# Patient Record
Sex: FEMALE | Race: WHITE | NOT HISPANIC OR LATINO | Employment: FULL TIME | URBAN - NONMETROPOLITAN AREA
[De-identification: names, ages, dates, MRNs, and addresses within clinical notes are randomized per-mention and may not be internally consistent; named-entity substitution may affect disease eponyms.]

---

## 2017-03-01 RX ORDER — CITALOPRAM 20 MG/1
20 TABLET ORAL DAILY
Qty: 30 TABLET | Refills: 2 | OUTPATIENT
Start: 2017-03-01 | End: 2020-02-23

## 2018-01-05 ENCOUNTER — HOSPITAL ENCOUNTER (OUTPATIENT)
Dept: ULTRASOUND IMAGING | Facility: HOSPITAL | Age: 43
Discharge: HOME | End: 2018-01-05
Admitting: NURSE PRACTITIONER

## 2018-01-05 DIAGNOSIS — R10.11 ABDOMINAL DISCOMFORT IN RIGHT UPPER QUADRANT: ICD-10-CM

## 2018-01-05 DIAGNOSIS — K21.9 GASTROESOPHAGEAL REFLUX DISEASE, ESOPHAGITIS PRESENCE NOT SPECIFIED: ICD-10-CM

## 2018-01-05 DIAGNOSIS — R11.0 NAUSEA: ICD-10-CM

## 2018-01-05 PROCEDURE — 76700 US EXAM ABDOM COMPLETE: CPT

## 2018-12-06 ENCOUNTER — TRANSCRIBE ORDERS (OUTPATIENT)
Dept: LAB | Facility: HOSPITAL | Age: 43
End: 2018-12-06

## 2018-12-06 ENCOUNTER — LAB (OUTPATIENT)
Dept: LAB | Facility: HOSPITAL | Age: 43
End: 2018-12-06
Attending: OBSTETRICS & GYNECOLOGY

## 2018-12-06 DIAGNOSIS — E78.6: ICD-10-CM

## 2018-12-06 DIAGNOSIS — E78.6: Primary | ICD-10-CM

## 2018-12-06 PROCEDURE — 83525 ASSAY OF INSULIN: CPT

## 2018-12-06 PROCEDURE — 36415 COLL VENOUS BLD VENIPUNCTURE: CPT

## 2018-12-09 LAB — INSULIN SERPL-ACNC: 12 UIU/ML

## 2019-05-08 ENCOUNTER — APPOINTMENT (OUTPATIENT)
Dept: GENERAL RADIOLOGY | Facility: HOSPITAL | Age: 44
End: 2019-05-08

## 2019-05-08 ENCOUNTER — HOSPITAL ENCOUNTER (EMERGENCY)
Facility: HOSPITAL | Age: 44
Discharge: HOME | End: 2019-05-08
Attending: EMERGENCY MEDICINE | Admitting: EMERGENCY MEDICINE

## 2019-05-08 VITALS
RESPIRATION RATE: 14 BRPM | WEIGHT: 218.56 LBS | HEART RATE: 89 BPM | HEIGHT: 62 IN | BODY MASS INDEX: 40.22 KG/M2 | OXYGEN SATURATION: 96 % | DIASTOLIC BLOOD PRESSURE: 68 MMHG | TEMPERATURE: 98.4 F | SYSTOLIC BLOOD PRESSURE: 113 MMHG

## 2019-05-08 DIAGNOSIS — K21.00 GERD WITH ESOPHAGITIS: Primary | ICD-10-CM

## 2019-05-08 DIAGNOSIS — R07.89 ATYPICAL CHEST PAIN: ICD-10-CM

## 2019-05-08 LAB
ALBUMIN SERPL-MCNC: 4.1 G/DL (ref 3.5–5.2)
ALBUMIN/GLOB SERPL: 1.3 G/DL
ALP SERPL-CCNC: 96 U/L (ref 39–117)
ALT SERPL W P-5'-P-CCNC: 17 U/L (ref 1–33)
ANION GAP SERPL CALCULATED.3IONS-SCNC: 14 MMOL/L
AST SERPL-CCNC: 14 U/L (ref 1–32)
B-HCG UR QL: NEGATIVE
BACTERIA UR QL AUTO: ABNORMAL /HPF
BASOPHILS # BLD AUTO: 0.04 10*3/MM3 (ref 0–0.2)
BASOPHILS NFR BLD AUTO: 0.3 % (ref 0–1.5)
BILIRUB SERPL-MCNC: 0.3 MG/DL (ref 0.2–1.2)
BILIRUB UR QL STRIP: NEGATIVE
BUN BLD-MCNC: 19 MG/DL (ref 6–20)
BUN/CREAT SERPL: 22.4 (ref 7–25)
CALCIUM SPEC-SCNC: 9.1 MG/DL (ref 8.6–10.5)
CHLORIDE SERPL-SCNC: 102 MMOL/L (ref 98–107)
CLARITY UR: CLEAR
CO2 SERPL-SCNC: 22 MMOL/L (ref 22–29)
COLOR UR: YELLOW
CREAT BLD-MCNC: 0.85 MG/DL (ref 0.57–1)
D-DIMER, QUANTITATIVE (MAD,POW, STR): 285 NG/ML (FEU) (ref 0–470)
DEPRECATED RDW RBC AUTO: 41.9 FL (ref 37–54)
EOSINOPHIL # BLD AUTO: 0.11 10*3/MM3 (ref 0–0.4)
EOSINOPHIL NFR BLD AUTO: 0.7 % (ref 0.3–6.2)
ERYTHROCYTE [DISTWIDTH] IN BLOOD BY AUTOMATED COUNT: 13.3 % (ref 12.3–15.4)
GFR SERPL CREATININE-BSD FRML MDRD: 73 ML/MIN/1.73
GLOBULIN UR ELPH-MCNC: 3.2 GM/DL
GLUCOSE BLD-MCNC: 116 MG/DL (ref 65–99)
GLUCOSE UR STRIP-MCNC: NEGATIVE MG/DL
HCT VFR BLD AUTO: 42.2 % (ref 34–46.6)
HGB BLD-MCNC: 13.8 G/DL (ref 12–15.9)
HGB UR QL STRIP.AUTO: ABNORMAL
HOLD SPECIMEN: NORMAL
HYALINE CASTS UR QL AUTO: ABNORMAL /LPF
IMM GRANULOCYTES # BLD AUTO: 0.07 10*3/MM3 (ref 0–0.05)
IMM GRANULOCYTES NFR BLD AUTO: 0.5 % (ref 0–0.5)
KETONES UR QL STRIP: NEGATIVE
LEUKOCYTE ESTERASE UR QL STRIP.AUTO: NEGATIVE
LIPASE SERPL-CCNC: 19 U/L (ref 13–60)
LYMPHOCYTES # BLD AUTO: 2.34 10*3/MM3 (ref 0.7–3.1)
LYMPHOCYTES NFR BLD AUTO: 15 % (ref 19.6–45.3)
MCH RBC QN AUTO: 28.6 PG (ref 26.6–33)
MCHC RBC AUTO-ENTMCNC: 32.7 G/DL (ref 31.5–35.7)
MCV RBC AUTO: 87.6 FL (ref 79–97)
MONOCYTES # BLD AUTO: 1.2 10*3/MM3 (ref 0.1–0.9)
MONOCYTES NFR BLD AUTO: 7.7 % (ref 5–12)
NEUTROPHILS # BLD AUTO: 11.79 10*3/MM3 (ref 1.7–7)
NEUTROPHILS NFR BLD AUTO: 75.8 % (ref 42.7–76)
NITRITE UR QL STRIP: NEGATIVE
NRBC BLD AUTO-RTO: 0 /100 WBC (ref 0–0.2)
NT-PROBNP SERPL-MCNC: <5 PG/ML (ref 5–450)
PH UR STRIP.AUTO: 6 [PH] (ref 5–9)
PLATELET # BLD AUTO: 245 10*3/MM3 (ref 140–450)
PMV BLD AUTO: 12.3 FL (ref 6–12)
POTASSIUM BLD-SCNC: 3.8 MMOL/L (ref 3.5–5.2)
PROT SERPL-MCNC: 7.3 G/DL (ref 6–8.5)
PROT UR QL STRIP: NEGATIVE
RBC # BLD AUTO: 4.82 10*6/MM3 (ref 3.77–5.28)
RBC # UR: ABNORMAL /HPF
REF LAB TEST METHOD: ABNORMAL
SODIUM BLD-SCNC: 138 MMOL/L (ref 136–145)
SP GR UR STRIP: 1.03 (ref 1–1.03)
SQUAMOUS #/AREA URNS HPF: ABNORMAL /HPF
TROPONIN T SERPL-MCNC: <0.01 NG/ML (ref 0–0.03)
UROBILINOGEN UR QL STRIP: ABNORMAL
WBC NRBC COR # BLD: 15.55 10*3/MM3 (ref 3.4–10.8)
WBC UR QL AUTO: ABNORMAL /HPF

## 2019-05-08 PROCEDURE — 84484 ASSAY OF TROPONIN QUANT: CPT | Performed by: EMERGENCY MEDICINE

## 2019-05-08 PROCEDURE — 83880 ASSAY OF NATRIURETIC PEPTIDE: CPT | Performed by: EMERGENCY MEDICINE

## 2019-05-08 PROCEDURE — 93010 ELECTROCARDIOGRAM REPORT: CPT | Performed by: INTERNAL MEDICINE

## 2019-05-08 PROCEDURE — 93005 ELECTROCARDIOGRAM TRACING: CPT

## 2019-05-08 PROCEDURE — 83690 ASSAY OF LIPASE: CPT | Performed by: EMERGENCY MEDICINE

## 2019-05-08 PROCEDURE — 85379 FIBRIN DEGRADATION QUANT: CPT | Performed by: EMERGENCY MEDICINE

## 2019-05-08 PROCEDURE — 85025 COMPLETE CBC W/AUTO DIFF WBC: CPT | Performed by: EMERGENCY MEDICINE

## 2019-05-08 PROCEDURE — 99284 EMERGENCY DEPT VISIT MOD MDM: CPT

## 2019-05-08 PROCEDURE — 80053 COMPREHEN METABOLIC PANEL: CPT | Performed by: EMERGENCY MEDICINE

## 2019-05-08 PROCEDURE — 93005 ELECTROCARDIOGRAM TRACING: CPT | Performed by: EMERGENCY MEDICINE

## 2019-05-08 PROCEDURE — 81025 URINE PREGNANCY TEST: CPT | Performed by: EMERGENCY MEDICINE

## 2019-05-08 PROCEDURE — 81001 URINALYSIS AUTO W/SCOPE: CPT | Performed by: EMERGENCY MEDICINE

## 2019-05-08 PROCEDURE — 71046 X-RAY EXAM CHEST 2 VIEWS: CPT

## 2019-05-08 RX ORDER — ASPIRIN 325 MG
325 TABLET ORAL ONCE
Status: COMPLETED | OUTPATIENT
Start: 2019-05-08 | End: 2019-05-08

## 2019-05-08 RX ORDER — ALUMINA, MAGNESIA, AND SIMETHICONE 2400; 2400; 240 MG/30ML; MG/30ML; MG/30ML
15 SUSPENSION ORAL ONCE
Status: COMPLETED | OUTPATIENT
Start: 2019-05-08 | End: 2019-05-08

## 2019-05-08 RX ORDER — SUCRALFATE 1 G/1
1 TABLET ORAL 4 TIMES DAILY
Qty: 60 TABLET | Refills: 0 | OUTPATIENT
Start: 2019-05-08 | End: 2021-07-26

## 2019-05-08 RX ADMIN — LIDOCAINE HYDROCHLORIDE 15 ML: 20 SOLUTION ORAL; TOPICAL at 21:36

## 2019-05-08 RX ADMIN — ALUMINUM HYDROXIDE, MAGNESIUM HYDROXIDE, AND DIMETHICONE 15 ML: 400; 400; 40 SUSPENSION ORAL at 21:35

## 2019-05-08 RX ADMIN — ASPIRIN 325 MG: 325 TABLET, COATED ORAL at 21:35

## 2019-05-09 NOTE — ED TRIAGE NOTES
Pt c/o chest pressure since 0500 this am, waxing and waning. Associated nausea. Started at rest. Took Pepto Bismol. Known gallbladder history. No known cardiac history.

## 2019-05-09 NOTE — ED PROVIDER NOTES
Subjective   43 years old female with history of GERD presented in the ER with substernal chest pressure/burning sensation since last night/early morning.  Constant.  No significant aggravating or relieving factor.  Mild to moderate intensity, not associated with any palpitations or shortness of breath.  Has taken Nexium with no significant relief.        History provided by:  Patient  Chest Pain   Pain location:  Substernal area  Pain quality: dull and pressure    Pain radiates to:  Does not radiate  Pain severity:  Moderate  Onset quality:  Gradual  Duration: since morning   Timing:  Constant  Progression:  Worsening  Chronicity:  Recurrent  Relieved by:  Nothing  Worsened by:  Nothing  Ineffective treatments:  Antacids  Associated symptoms: no abdominal pain, no anxiety, no back pain, no claudication, no cough, no diaphoresis, no dizziness, no fever, no lower extremity edema, no near-syncope, no numbness, no palpitations, no shortness of breath, no vomiting and no weakness    Risk factors: hypertension and obesity    Risk factors: no coronary artery disease, no diabetes mellitus and no smoking        Review of Systems   Constitutional: Negative for diaphoresis and fever.   HENT: Negative for congestion, sinus pain and sore throat.    Eyes: Negative for pain.   Respiratory: Positive for chest tightness. Negative for cough and shortness of breath.    Cardiovascular: Positive for chest pain. Negative for palpitations, claudication and near-syncope.   Gastrointestinal: Negative for abdominal pain and vomiting.   Genitourinary: Negative for flank pain.   Musculoskeletal: Negative for back pain.   Skin: Negative for color change.   Neurological: Negative for dizziness, weakness and numbness.   Psychiatric/Behavioral: Negative for agitation.       Past Medical History:   Diagnosis Date   • Acute pharyngitis    • Allergic rhinitis    • Cough    • Depression    • Dysfunction of eustachian tube    • Generalized anxiety  disorder    • H/O screening mammography    • History of mammography, screening     [facility]GRAM SCREENING [facility] (jan 2016)   • Motion sickness    • Otalgia    • Panic disorder    • Upper abdominal pain, unspecified    • Upper respiratory infection        Allergies   Allergen Reactions   • Codeine        Past Surgical History:   Procedure Laterality Date   • PAP SMEAR  10/28/2008    PAP SMEAR (normal)   • WISDOM TOOTH EXTRACTION      Dental procedure (wisdom teeth extraction)       History reviewed. No pertinent family history.    Social History     Socioeconomic History   • Marital status:      Spouse name: Not on file   • Number of children: Not on file   • Years of education: Not on file   • Highest education level: Not on file   Tobacco Use   • Smoking status: Never Smoker   • Smokeless tobacco: Never Used   Substance and Sexual Activity   • Alcohol use: Yes     Comment: social   • Drug use: No   • Sexual activity: Defer           Objective   Physical Exam   Constitutional: She is oriented to person, place, and time. She appears well-developed and well-nourished.   HENT:   Head: Normocephalic.   Eyes: EOM are normal. Pupils are equal, round, and reactive to light.   Neck: Normal range of motion.   Cardiovascular: Normal rate and regular rhythm.   Pulmonary/Chest: Effort normal and breath sounds normal. She has no decreased breath sounds. She has no wheezes.   Musculoskeletal: Normal range of motion.   Neurological: She is alert and oriented to person, place, and time.   Skin: Skin is warm and dry. Capillary refill takes less than 2 seconds.   Psychiatric: She has a normal mood and affect. Her behavior is normal.   Nursing note and vitals reviewed.      ECG 12 Lead    Performed by: Jorge Zavala MD  Authorized by: Jorge Zavala MD   Interpreted by physician  Rhythm: sinus tachycardia  Rate: tachycardic  BPM: 108  QRS axis: normal  Conduction: conduction normal  ST Segments: ST segments normal  T  Waves: T waves normal  Clinical impression: non-specific ECG                 ED Course                  MDM  Number of Diagnoses or Management Options  Atypical chest pain:   GERD with esophagitis:   Diagnosis management comments: 43-year-old is evaluated in the ER for substernal chest pressure.  EKG did not show any acute ischemic changes.  Has negative plan to be done.  No acute finding on chest x-ray.  Patient is given GI cocktail and on reevaluation her pain is completely relieved.  She is off her second troponin but patient does not want to stay.  Technically one troponin rules out and she is been having pain for almost 16 hours.  Heart score is low.  She is advised to follow-up outpatient with cardiology/primary care.  I would also start her on Carafate as her symptoms more seems GERD with esophagitis.  Discussed signs of worsening needing return to ER which is understanding.       Amount and/or Complexity of Data Reviewed  Clinical lab tests: ordered and reviewed  Tests in the radiology section of CPT®: ordered and reviewed      Labs Reviewed   COMPREHENSIVE METABOLIC PANEL - Abnormal; Notable for the following components:       Result Value    Glucose 116 (*)     All other components within normal limits    Narrative:     GFR Normal >60  Chronic Kidney Disease <60  Kidney Failure <15   URINALYSIS W/ MICROSCOPIC IF INDICATED (NO CULTURE) - Abnormal; Notable for the following components:    Blood, UA Small (1+) (*)     All other components within normal limits   BNP (IN-HOUSE) - Abnormal; Notable for the following components:    proBNP <5.0 (*)     All other components within normal limits    Narrative:     Among patients with dyspnea, NT-proBNP is highly sensitive for the detection of acute congestive heart failure. In addition NT-proBNP of <300 pg/ml effectively rules out acute congestive heart failure with 99% negative predictive value.   CBC WITH AUTO DIFFERENTIAL - Abnormal; Notable for the following  components:    WBC 15.55 (*)     MPV 12.3 (*)     Lymphocyte % 15.0 (*)     Neutrophils, Absolute 11.79 (*)     Monocytes, Absolute 1.20 (*)     Immature Grans, Absolute 0.07 (*)     All other components within normal limits   URINALYSIS, MICROSCOPIC ONLY - Abnormal; Notable for the following components:    RBC, UA 6-12 (*)     Squamous Epithelial Cells, UA 3-5 (*)     All other components within normal limits   LIPASE - Normal   D-DIMER, QUANTITATIVE - Normal    Narrative:     Dimer values <500 ng/ml FEU are FDA approved as aid in diagnosis of deep venous thrombosis and pulmonary embolism.  This test should not be used in an exclusion strategy with pretest probability alone.    A recent guideline regarding diagnosis for pulmonary thromboembolism recommends an adjusted exclusion criterion of age x 10 ng/ml FEU for patients >50 years of age ([employer] Intern Med 2015; 163: 701-711).   TROPONIN (IN-HOUSE) - Normal    Narrative:     Troponin T Reference Range:  <= 0.03 ng/mL-   Negative for AMI  >0.03 ng/mL-     Abnormal for myocardial necrosis.  Clinicians would have to utilize clinical acumen, EKG, Troponin and serial changes to determine if it is an Acute Myocardial Infarction or myocardial injury due to an underlying chronic condition.    PREGNANCY, URINE - Normal   CBC AND DIFFERENTIAL    Narrative:     The following orders were created for panel order CBC & Differential.  Procedure                               Abnormality         Status                     ---------                               -----------         ------                     CBC Auto Differential[[id number]]        Abnormal            Final result                 Please view results for these tests on the individual orders.   EXTRA TUBES    Narrative:     The following orders were created for panel order Extra Tubes.  Procedure                               Abnormality         Status                     ---------                                -----------         ------                     [facility] - [facility][[id number]]                                   Final result                 Please view results for these tests on the individual orders.   GOLD TOP - SST       Xr Chest 2 View    Result Date: 5/8/2019  Narrative: TWO VIEW CHEST HISTORY: Chest pain Frontal and lateral films of the chest were obtained. COMPARISON: None EKG leads. The lungs are clear of an acute process. The heart is not enlarged. The pulmonary vasculature is not increased. No pleural effusion. No pneumothorax. No acute osseous abnormality. Mild degenerative changes are present in the thoracic spine.     Impression: CONCLUSION: No Acute Disease [id number] Electronically signed by:  Brijesh Cifuentes MD  5/8/2019 10:35 PM CDT Workstation: [id number]          Final diagnoses:   GERD with esophagitis   Atypical chest pain            Jorge Zavala MD  05/08/19 1946

## 2020-07-08 ENCOUNTER — APPOINTMENT (OUTPATIENT)
Dept: GENERAL RADIOLOGY | Facility: HOSPITAL | Age: 45
End: 2020-07-08

## 2020-07-08 ENCOUNTER — HOSPITAL ENCOUNTER (EMERGENCY)
Facility: HOSPITAL | Age: 45
Discharge: HOME | End: 2020-07-08
Attending: FAMILY MEDICINE | Admitting: FAMILY MEDICINE

## 2020-07-08 VITALS
DIASTOLIC BLOOD PRESSURE: 85 MMHG | HEIGHT: 62 IN | OXYGEN SATURATION: 94 % | TEMPERATURE: 98.4 F | SYSTOLIC BLOOD PRESSURE: 144 MMHG | WEIGHT: 217 LBS | RESPIRATION RATE: 20 BRPM | HEART RATE: 96 BPM | BODY MASS INDEX: 39.93 KG/M2

## 2020-07-08 DIAGNOSIS — I10 ESSENTIAL HYPERTENSION: ICD-10-CM

## 2020-07-08 DIAGNOSIS — F41.9 ANXIETY: Primary | ICD-10-CM

## 2020-07-08 LAB
ALBUMIN SERPL-MCNC: 4.5 G/DL (ref 3.5–5.2)
ALBUMIN/GLOB SERPL: 1.6 G/DL
ALP SERPL-CCNC: 109 U/L (ref 39–117)
ALT SERPL W P-5'-P-CCNC: 19 U/L (ref 1–33)
ANION GAP SERPL CALCULATED.3IONS-SCNC: 11 MMOL/L (ref 5–15)
AST SERPL-CCNC: 20 U/L (ref 1–32)
BASOPHILS # BLD AUTO: 0.05 10*3/MM3 (ref 0–0.2)
BASOPHILS NFR BLD AUTO: 0.3 % (ref 0–1.5)
BILIRUB SERPL-MCNC: 0.3 MG/DL (ref 0–1.2)
BUN SERPL-MCNC: 16 MG/DL (ref 6–20)
BUN/CREAT SERPL: 16 (ref 7–25)
CALCIUM SPEC-SCNC: 9.2 MG/DL (ref 8.6–10.5)
CHLORIDE SERPL-SCNC: 102 MMOL/L (ref 98–107)
CO2 SERPL-SCNC: 25 MMOL/L (ref 22–29)
CREAT SERPL-MCNC: 1 MG/DL (ref 0.57–1)
DEPRECATED RDW RBC AUTO: 42.9 FL (ref 37–54)
EOSINOPHIL # BLD AUTO: 0.07 10*3/MM3 (ref 0–0.4)
EOSINOPHIL NFR BLD AUTO: 0.5 % (ref 0.3–6.2)
ERYTHROCYTE [DISTWIDTH] IN BLOOD BY AUTOMATED COUNT: 13.6 % (ref 12.3–15.4)
GFR SERPL CREATININE-BSD FRML MDRD: 60 ML/MIN/1.73
GLOBULIN UR ELPH-MCNC: 2.9 GM/DL
GLUCOSE SERPL-MCNC: 121 MG/DL (ref 65–99)
HCT VFR BLD AUTO: 43.4 % (ref 34–46.6)
HGB BLD-MCNC: 14.6 G/DL (ref 12–15.9)
HOLD SPECIMEN: NORMAL
HOLD SPECIMEN: NORMAL
IMM GRANULOCYTES # BLD AUTO: 0.09 10*3/MM3 (ref 0–0.05)
IMM GRANULOCYTES NFR BLD AUTO: 0.6 % (ref 0–0.5)
LYMPHOCYTES # BLD AUTO: 2.29 10*3/MM3 (ref 0.7–3.1)
LYMPHOCYTES NFR BLD AUTO: 15 % (ref 19.6–45.3)
MCH RBC QN AUTO: 29.3 PG (ref 26.6–33)
MCHC RBC AUTO-ENTMCNC: 33.6 G/DL (ref 31.5–35.7)
MCV RBC AUTO: 87 FL (ref 79–97)
MONOCYTES # BLD AUTO: 1.05 10*3/MM3 (ref 0.1–0.9)
MONOCYTES NFR BLD AUTO: 6.9 % (ref 5–12)
NEUTROPHILS NFR BLD AUTO: 11.74 10*3/MM3 (ref 1.7–7)
NEUTROPHILS NFR BLD AUTO: 76.7 % (ref 42.7–76)
NRBC BLD AUTO-RTO: 0 /100 WBC (ref 0–0.2)
PLATELET # BLD AUTO: 305 10*3/MM3 (ref 140–450)
PMV BLD AUTO: 11.8 FL (ref 6–12)
POTASSIUM SERPL-SCNC: 4 MMOL/L (ref 3.5–5.2)
PROT SERPL-MCNC: 7.4 G/DL (ref 6–8.5)
RBC # BLD AUTO: 4.99 10*6/MM3 (ref 3.77–5.28)
SODIUM SERPL-SCNC: 138 MMOL/L (ref 136–145)
TROPONIN T SERPL-MCNC: <0.01 NG/ML (ref 0–0.03)
WBC # BLD AUTO: 15.29 10*3/MM3 (ref 3.4–10.8)
WHOLE BLOOD HOLD SPECIMEN: NORMAL
WHOLE BLOOD HOLD SPECIMEN: NORMAL

## 2020-07-08 PROCEDURE — 93010 ELECTROCARDIOGRAM REPORT: CPT | Performed by: INTERNAL MEDICINE

## 2020-07-08 PROCEDURE — 96374 THER/PROPH/DIAG INJ IV PUSH: CPT

## 2020-07-08 PROCEDURE — 93005 ELECTROCARDIOGRAM TRACING: CPT | Performed by: STUDENT IN AN ORGANIZED HEALTH CARE EDUCATION/TRAINING PROGRAM

## 2020-07-08 PROCEDURE — 84484 ASSAY OF TROPONIN QUANT: CPT | Performed by: STUDENT IN AN ORGANIZED HEALTH CARE EDUCATION/TRAINING PROGRAM

## 2020-07-08 PROCEDURE — 85025 COMPLETE CBC W/AUTO DIFF WBC: CPT | Performed by: STUDENT IN AN ORGANIZED HEALTH CARE EDUCATION/TRAINING PROGRAM

## 2020-07-08 PROCEDURE — 99283 EMERGENCY DEPT VISIT LOW MDM: CPT

## 2020-07-08 PROCEDURE — 25010000002 LORAZEPAM PER 2 MG: Performed by: STUDENT IN AN ORGANIZED HEALTH CARE EDUCATION/TRAINING PROGRAM

## 2020-07-08 PROCEDURE — 71045 X-RAY EXAM CHEST 1 VIEW: CPT

## 2020-07-08 PROCEDURE — 80053 COMPREHEN METABOLIC PANEL: CPT | Performed by: STUDENT IN AN ORGANIZED HEALTH CARE EDUCATION/TRAINING PROGRAM

## 2020-07-08 RX ORDER — HYDROXYZINE PAMOATE 25 MG/1
50 CAPSULE ORAL ONCE
Status: DISCONTINUED | OUTPATIENT
Start: 2020-07-08 | End: 2020-07-08

## 2020-07-08 RX ORDER — SODIUM CHLORIDE 0.9 % (FLUSH) 0.9 %
10 SYRINGE (ML) INJECTION AS NEEDED
Status: DISCONTINUED | OUTPATIENT
Start: 2020-07-08 | End: 2020-07-08 | Stop reason: HOSPADM

## 2020-07-08 RX ORDER — LORAZEPAM 2 MG/ML
0.5 INJECTION INTRAMUSCULAR EVERY 4 HOURS PRN
Status: DISCONTINUED | OUTPATIENT
Start: 2020-07-08 | End: 2020-07-08

## 2020-07-08 RX ORDER — METOPROLOL TARTRATE 5 MG/5ML
5 INJECTION INTRAVENOUS ONCE
Status: DISCONTINUED | OUTPATIENT
Start: 2020-07-08 | End: 2020-07-08

## 2020-07-08 RX ORDER — LORAZEPAM 2 MG/ML
INJECTION INTRAMUSCULAR
Status: DISCONTINUED
Start: 2020-07-08 | End: 2020-07-08 | Stop reason: HOSPADM

## 2020-07-08 RX ORDER — LORAZEPAM 2 MG/ML
0.5 INJECTION INTRAMUSCULAR ONCE
Status: COMPLETED | OUTPATIENT
Start: 2020-07-08 | End: 2020-07-08

## 2020-07-08 RX ADMIN — LORAZEPAM 0.5 MG: 2 INJECTION INTRAMUSCULAR; INTRAVENOUS at 14:55

## 2020-07-08 NOTE — ED TRIAGE NOTES
"Pt was at work and began to feel \"bad.\" She checked her bp and hr and noted them to both be high.  "

## 2020-07-09 ENCOUNTER — EPISODE CHANGES (OUTPATIENT)
Dept: CASE MANAGEMENT | Facility: OTHER | Age: 45
End: 2020-07-09

## 2020-07-09 NOTE — ED PROVIDER NOTES
Subjective   History of Present Illness  Patient works in the [employer], and was feeling funny so checked her blood pressure.  Her blood pressure was 190/102.  Patient rechecked it a few minutes later and it dropped down to 150.  Patient came to the emergency department.  She denies chest pain shortness of breath nausea vomiting.  She does not take any medicine for blood pressure.  She did state that she had an episode like this previously and it was a anxiety attack.  Patient takes Zoloft and Atarax for anxiety.  Review of Systems   Constitutional: Positive for activity change. Negative for appetite change, chills, fatigue and fever.   HENT: Negative for drooling, ear discharge, ear pain, facial swelling, hearing loss, mouth sores, rhinorrhea and sinus pain.    Eyes: Negative for pain, redness and itching.   Respiratory: Negative for cough, choking, chest tightness, shortness of breath and stridor.    Cardiovascular: Negative for chest pain, palpitations and leg swelling.   Gastrointestinal: Negative for abdominal distention, abdominal pain, anal bleeding, blood in stool, constipation, diarrhea and nausea.   Endocrine: Negative for heat intolerance, polydipsia and polyphagia.   Genitourinary: Negative for dysuria, flank pain, frequency and genital sores.   Musculoskeletal: Negative for back pain, gait problem, joint swelling and myalgias.   Skin: Negative for pallor and rash.   Neurological: Negative for seizures, facial asymmetry, speech difficulty, light-headedness, numbness and headaches.   Hematological: Negative for adenopathy. Does not bruise/bleed easily.   Psychiatric/Behavioral: Negative for confusion, decreased concentration, dysphoric mood and hallucinations. The patient is not nervous/anxious and is not hyperactive.        Past Medical History:   Diagnosis Date   • Acute pharyngitis    • Allergic rhinitis    • Cough    • Depression    • Dysfunction of eustachian tube    • Generalized anxiety disorder     • H/O screening mammography    • History of mammography, screening     M[facility]GRAM SCREENING 95408 [facility] (jan 2016)   • Motion sickness    • Otalgia    • Panic disorder    • Upper abdominal pain, unspecified    • Upper respiratory infection        Allergies   Allergen Reactions   • Codeine Hallucinations   • Lisinopril Cough       Past Surgical History:   Procedure Laterality Date   • PAP SMEAR  10/28/2008    PAP SMEAR (normal)   • WISDOM TOOTH EXTRACTION      Dental procedure (wisdom teeth extraction)       History reviewed. No pertinent family history.    Social History     Socioeconomic History   • Marital status:      Spouse name: Not on file   • Number of children: Not on file   • Years of education: Not on file   • Highest education level: Not on file   Tobacco Use   • Smoking status: Never Smoker   • Smokeless tobacco: Never Used   Substance and Sexual Activity   • Alcohol use: Yes     Comment: social   • Drug use: No   • Sexual activity: Defer           Objective   Physical Exam   Constitutional: She is oriented to person, place, and time. She appears well-developed and well-nourished.   HENT:   Head: Normocephalic and atraumatic.   Right Ear: External ear normal.   Left Ear: External ear normal.   Nose: Nose normal.   Mouth/Throat: Oropharynx is clear and moist.   Eyes: Pupils are equal, round, and reactive to light. Conjunctivae and EOM are normal.   Neck: Normal range of motion. Neck supple.   Cardiovascular: Normal rate, regular rhythm, normal heart sounds and intact distal pulses. Exam reveals no gallop.   No murmur heard.  Pulmonary/Chest: Effort normal and breath sounds normal. No stridor. No respiratory distress. She has no wheezes. She has no rales. She exhibits no tenderness.   Abdominal: Soft. Bowel sounds are normal.   Musculoskeletal: Normal range of motion.   Neurological: She is alert and oriented to person, place, and time.   Skin: Skin is warm and dry.   Psychiatric: She has a  normal mood and affect. Her behavior is normal. Judgment and thought content normal.       Procedures           ED Course  ED Course as of Jul 08 2028   Wed Jul 08, 2020 2028 She was given Ativan.  Her blood pressure came down on its own.  Patient is to follow-up with her primary care and possibly get started on a antihypertensive medication.  Patient is to return to the ED if she has worsening symptoms    [SA]      ED Course User Index  [SA] Amberly Jones MD               I personally saw and examined the patient. I have reviewed and agree with the residents findings including all diagnostics, interpretations, and treatment plans as documented. I was present for the key portions of the separate performed procedures and inclusive time noted in any critical care situation.                                  MDM    Final diagnoses:   Anxiety   Essential hypertension            Amberly Jones M.D. PGY2  [facility]  [address] [place] [address]  Office: [phone]    This document has been electronically signed by Amberly Jones MD on July 8, 2020 20:28         Amberly Jones MD  Resident  07/08/20 2028       Raghav Og MD  07/09/20 [id number]

## 2020-07-17 ENCOUNTER — EPISODE CHANGES (OUTPATIENT)
Dept: CASE MANAGEMENT | Facility: OTHER | Age: 45
End: 2020-07-17

## 2020-08-04 ENCOUNTER — OFFICE VISIT (OUTPATIENT)
Dept: PODIATRY | Facility: CLINIC | Age: 45
End: 2020-08-04

## 2020-08-04 VITALS — HEIGHT: 62 IN | HEART RATE: 97 BPM | WEIGHT: 217 LBS | BODY MASS INDEX: 39.93 KG/M2 | OXYGEN SATURATION: 98 %

## 2020-08-04 DIAGNOSIS — M76.61 ACHILLES TENDINITIS OF RIGHT LOWER EXTREMITY: Primary | ICD-10-CM

## 2020-08-04 DIAGNOSIS — M24.573 EQUINUS CONTRACTURE OF ANKLE: ICD-10-CM

## 2020-08-04 DIAGNOSIS — M79.671 RIGHT FOOT PAIN: ICD-10-CM

## 2020-08-04 DIAGNOSIS — M77.31 POSTERIOR CALCANEAL EXOSTOSIS, RIGHT: ICD-10-CM

## 2020-08-04 PROCEDURE — 99203 OFFICE O/P NEW LOW 30 MIN: CPT | Performed by: PODIATRIST

## 2020-08-04 RX ORDER — MELOXICAM 15 MG/1
15 TABLET ORAL DAILY
Qty: 21 TABLET | Refills: 0 | Status: SHIPPED | OUTPATIENT
Start: 2020-08-04 | End: 2022-05-26

## 2020-08-04 RX ORDER — METHYLPREDNISOLONE 4 MG/1
TABLET ORAL
Qty: 21 TABLET | Refills: 0 | Status: SHIPPED | OUTPATIENT
Start: 2020-08-04 | End: 2021-07-26

## 2020-08-04 NOTE — PROGRESS NOTES
Asha Alvarez  1975  44 y.o. female   NON DIABETIC     Patient came to clinic for concern of right foot pain states her pain is 3/10    2020  Chief Complaint   Patient presents with   • Right Foot - Pain           History of Present Illness    Asha Alvarez is a 44 y.o. female who presents for evaluation of right foot pain.  States his pain is been ongoing for about 6 months.  She denies any trauma or injuries to it.  States pain is primarily located in the backside of her heel.  She describes this as achy and at times stabbing.  The pain is worse with prolonged weightbearing and activity and somewhat relieved with rest.  She states shortly prior to the onset of this pain she was having some pain in the bottom of her foot which she attributed to plantar fasciitis.  She self treated this with some NSAIDs and shoe gear modification and this pain had seem to improve.  She denies any prior treatments for this new problem.      Past Medical History:   Diagnosis Date   • Acute pharyngitis    • Allergic rhinitis    • Asthma    • Cough    • Depression    • Dysfunction of eustachian tube    • Generalized anxiety disorder    • H/O screening mammography    • High blood pressure    • History of mammography, screening     MAM[facility]GRAM SCREENING [facility]73 [facility] (2016)   • Ingrown toenail    • Motion sickness    • Otalgia    • Panic disorder    • Plantar fasciitis    • Upper abdominal pain, unspecified    • Upper respiratory infection          Past Surgical History:   Procedure Laterality Date   •  SECTION     • PAP SMEAR  10/28/2008    PAP SMEAR (normal)   • TUBAL ABDOMINAL LIGATION     • WISDOM TOOTH EXTRACTION      Dental procedure (wisdom teeth extraction)         Family History   Problem Relation Age of Onset   • Osteoporosis Mother    • Diabetes Mother    • Hypertension Mother    • Diabetes Father    • Hypertension Father    • Hypertension Maternal Grandfather    • Hypertension Paternal  Grandmother    • Thyroid disease Paternal Grandmother    • Heart disease Paternal Grandfather    • Hypertension Paternal Grandfather          Social History     Socioeconomic History   • Marital status:      Spouse name: Not on file   • Number of children: Not on file   • Years of education: Not on file   • Highest education level: Not on file   Tobacco Use   • Smoking status: Never Smoker   • Smokeless tobacco: Never Used   Substance and Sexual Activity   • Alcohol use: Yes     Comment: social   • Drug use: No   • Sexual activity: Defer         Current Outpatient Medications   Medication Sig Dispense Refill   • albuterol sulfate  (90 Base) MCG/ACT inhaler Inhale 2 puffs Every 6 (Six) Hours As Needed for bronchospasm. 18 g 5   • albuterol sulfate  (90 Base) MCG/ACT inhaler Inhale 1 to 2 puffs Every 4 (Four) to 6 (Six) Hours As Needed. 18 g 2   • buPROPion SR (WELLBUTRIN SR) 200 MG 12 hr tablet Take 1 tablet by mouth every morning. 90 tablet 0   • buPROPion SR (WELLBUTRIN SR) 200 MG 12 hr tablet Take 1 tablet by mouth Every Morning. 90 tablet 0   • cetirizine (zyrTEC) 10 MG tablet Take 1 tablet by mouth Daily. 30 tablet 2   • esomeprazole (nexIUM) 40 MG capsule Take 1 capsule by mouth Daily. 90 capsule 1   • ezetimibe (ZETIA) 10 MG tablet Take 1 tablet by mouth Daily. 30 tablet 2   • fluticasone (FLONASE) 50 MCG/ACT nasal spray Instill 1 spray into each nostril once Daily. 16 g 2   • fluticasone (FLONASE) 50 MCG/ACT nasal spray Instil 1 to 2 puffs in the nostrils as directed once daily. 16 g 2   • hydroCHLOROthiazide (HYDRODIURIL) 12.5 MG tablet Take 1 tablet by mouth Daily. 30 tablet 1   • hydrOXYzine pamoate (VISTARIL) 25 MG capsule Take 1 capsule by mouth Every 8 (Eight) Hours As Needed for breakthrough anxiety. 90 capsule 5   • hydrOXYzine pamoate (VISTARIL) 25 MG capsule Take 1 capsule by mouth Every 8 (Eight) Hours As Needed. 90 capsule 0   • LORazepam (ATIVAN) 0.5 MG tablet Take 1 tablet  "by mouth Daily As Needed for anxiety. 30 tablet 0   • montelukast (SINGULAIR) 10 MG tablet Take 1 tablet by mouth Daily. 30 tablet 4   • montelukast (SINGULAIR) 10 MG tablet Take 1 tablet by mouth Daily. 90 tablet 1   • pravastatin (PRAVACHOL) 10 MG tablet Take 1 tablet by mouth at bedtime. 90 tablet 1   • pseudoephedrine (SUDAFED) 30 MG tablet Take 2 tablets by mouth Every 6 (Six) Hours As Needed for Congestion. 30 tablet 0   • sertraline (ZOLOFT) 100 MG tablet Take 1 tablet by mouth Daily. 90 tablet 0   • sertraline (ZOLOFT) 50 MG tablet Take 1 tablet by mouth daily. 90 tablet 0   • sucralfate (CARAFATE) 1 g tablet Take 1 tablet by mouth 4 (Four) Times a Day. 60 tablet 0   • sucralfate (CARAFATE) 1 g tablet Take 1 tablet by mouth 2 (Two) Times a Day on an empty stomach. 60 tablet 0   • venlafaxine XR (EFFEXOR-XR) 37.5 MG 24 hr capsule Take 1 capsule by mouth Daily. 30 capsule 1   • Vitamin D, Cholecalciferol, (CHOLECALCIFEROL) 400 units tablet Take 1 tablet by mouth Daily. 30 tablet 3   • meloxicam (MOBIC) 15 MG tablet Take 1 tablet by mouth Daily **Take with food** 21 tablet 0   • methylPREDNISolone (MEDROL, KIESHA,) 4 MG tablet Take as directed on package with food. 21 tablet 0     No current facility-administered medications for this visit.          OBJECTIVE    Pulse 97   Ht 157.5 cm (62\")   Wt 98.4 kg (217 lb)   SpO2 98%   BMI 39.69 kg/m²       Review of Systems   Constitutional: Negative.    HENT: Negative.    Eyes: Negative.    Respiratory: Negative.    Cardiovascular: Negative.    Gastrointestinal: Negative.    Endocrine: Negative.    Genitourinary: Negative.    Musculoskeletal: Positive for back pain.        Foot pain    Skin: Negative.    Allergic/Immunologic: Negative.    Neurological: Negative.    Hematological: Negative.    Psychiatric/Behavioral: The patient is nervous/anxious.          Physical Exam   Constitutional: he appears well-developed and well-nourished.   HEENT: Normocephalic. " Atraumatic.  CV: No CP. RRR  Resp: Non-labored respirations.  Psychiatric: he has a normal mood and affect. his behavior is normal.         Lower Extremity Exam:  Vascular: DP/PT pulses palpable 2+.   No edema  Foot warm  Negative Gerry  Neuro: Protective sensation intact, b/l.  Light touch sensation intact, b/l  DTRs intact  Integument: No open wounds or lesions.  No erythema, scaling  No ecchymosis  No masses  Musculoskeletal:  B/L LE muscle strength 5/5.   Achilles, TA, TP, Peroneal tendons intact  Mild tenderness on palpation of posterior Achilles insertion, right  Minimal tenderness with deep palpation of retrocalcaneal bursa, right  No pain with maximum dorsiflexion of right foot          ASSESSMENT AND PLAN    Asha was seen today for pain.    Diagnoses and all orders for this visit:    Achilles tendinitis of right lower extremity    Right foot pain  -     XR Foot 3+ View Right    Posterior calcaneal exostosis, right    Equinus contracture of ankle    Other orders  -     methylPREDNISolone (MEDROL, KIESHA,) 4 MG tablet; Take as directed on package with food.  -     meloxicam (MOBIC) 15 MG tablet; Take 1 tablet by mouth Daily **Take with food**      -Comprehensive foot and ankle exam performed  -Radiographs ordered and reviewed  -Educated pt on diagnosis, etiology and treatment of insertional achilles tendonitis  -Continue motion control shoe  -Dispensed gel heel lift  -Begin stretching regimen, education materials and Theraband dispensed.  -Rx Medrol dosepak followed by Meloxicam 15 mg qday for 1 month, not to be taken with other NSAIDs  -Recheck 4 weeks, as needed            This document has been electronically signed by Mohan Mera DPM on August 5, 2020 12:00     EMR Dragon/Transcription disclaimer:   Much of this encounter note is an electronic transcription/translation of spoken language to printed text. The electronic translation of spoken language may permit erroneous, or at times, nonsensical  words or phrases to be inadvertently transcribed; Although I have reviewed the note for such errors, some may still exist.    Mohan Mera DPM  8/5/2020  12:00

## 2020-08-19 RX ORDER — MELOXICAM 15 MG/1
15 TABLET ORAL DAILY
Qty: 21 TABLET | Refills: 0 | Status: CANCELLED | OUTPATIENT
Start: 2020-08-19

## 2021-01-29 ENCOUNTER — IMMUNIZATION (OUTPATIENT)
Dept: VACCINE CLINIC | Facility: HOSPITAL | Age: 46
End: 2021-01-29

## 2021-01-29 PROCEDURE — 91300 HC SARSCOV02 VAC 30MCG/0.3ML IM: CPT | Performed by: THORACIC SURGERY (CARDIOTHORACIC VASCULAR SURGERY)

## 2021-01-29 PROCEDURE — 0001A: CPT | Performed by: THORACIC SURGERY (CARDIOTHORACIC VASCULAR SURGERY)

## 2021-02-19 ENCOUNTER — IMMUNIZATION (OUTPATIENT)
Dept: VACCINE CLINIC | Facility: HOSPITAL | Age: 46
End: 2021-02-19

## 2021-02-19 PROCEDURE — 0002A: CPT | Performed by: THORACIC SURGERY (CARDIOTHORACIC VASCULAR SURGERY)

## 2021-02-19 PROCEDURE — 91300 HC SARSCOV02 VAC 30MCG/0.3ML IM: CPT | Performed by: THORACIC SURGERY (CARDIOTHORACIC VASCULAR SURGERY)

## 2021-05-20 ENCOUNTER — OFFICE VISIT (OUTPATIENT)
Dept: SLEEP MEDICINE | Facility: HOSPITAL | Age: 46
End: 2021-05-20

## 2021-05-20 VITALS
BODY MASS INDEX: 41.59 KG/M2 | OXYGEN SATURATION: 97 % | HEIGHT: 62 IN | HEART RATE: 105 BPM | SYSTOLIC BLOOD PRESSURE: 105 MMHG | WEIGHT: 226 LBS | DIASTOLIC BLOOD PRESSURE: 76 MMHG

## 2021-05-20 DIAGNOSIS — R06.83 SNORING: Primary | ICD-10-CM

## 2021-05-20 DIAGNOSIS — F51.04 PSYCHOPHYSIOLOGICAL INSOMNIA: ICD-10-CM

## 2021-05-20 DIAGNOSIS — G47.19 EXCESSIVE DAYTIME SLEEPINESS: ICD-10-CM

## 2021-05-20 PROBLEM — I10 HYPERTENSION: Status: ACTIVE | Noted: 2021-05-20

## 2021-05-20 PROBLEM — F32.A DEPRESSION: Status: ACTIVE | Noted: 2021-05-20

## 2021-05-20 PROBLEM — F41.9 ANXIETY: Status: ACTIVE | Noted: 2021-05-20

## 2021-05-20 PROCEDURE — 99213 OFFICE O/P EST LOW 20 MIN: CPT | Performed by: NURSE PRACTITIONER

## 2021-05-20 NOTE — PROGRESS NOTES
New Patient Sleep Medicine Consultation    Encounter Date: 5/20/2021         Patient's PCP: Julianne Garcia MD  Referring provider: No ref. provider found  Reason for consultation chief complaint: snoring, loud disruptive snoring, awakening gasping for breath, excessive daytime sleepiness, unrefreshing sleep and insomnia      Asha Alvarez admits to snoring, unrestful sleep, High blod pressure, gasping during sleep, decreased libido, excessive daytime sleepiness, stop breathing during sleep, frequent unexplained arousals, irritability, sleeping less than 6 hours per night, Disturbed or restless sleep, memory loss, choking duing sleep, Up to the bathroom at night, abnormal or violent dreams, sleepwalking or sleeptalking, difficulty staying asleep and takes medicine to help go to sleep for many years. She denies cataplexy, sleep paralysis, or hypnagogic hallucinations. She takes vistaril some nights for sleep. She has no sleepiness with driving. She naps  Some days. She has never had a sleep study. She sleeps in a bed with her  and small dog.     Asha Alvarez is a 45 y.o. female whose bedtime is ~ 2200. She  falls asleep after 0-30 minutes, and is up 2-3 times per night. She wakes up ~ 0600. She endorses 5-6 hours of sleep.     She is a never smoker.    She drinks 0 cups of coffee, 0 teas, and 3-4 sodas per day. She drinks 0 alcoholic beverages per week.    Marital status:    Occupation: RN   Children: 1  0 brothers and 1 sisters  FH of sleep disorders: father had GET on CPAP      Past Medical History:   Diagnosis Date   • Acute pharyngitis    • Allergic rhinitis    • Asthma    • Cough    • Depression    • Dysfunction of eustachian tube    • Generalized anxiety disorder    • H/O screening mammography    • High blood pressure    • History of mammography, screening     MAMMOGRAM SCREENING [facility]3069 - [facility] (jan 2016)   • Ingrown toenail    • Motion sickness    • Otalgia    • Panic disorder  "   • Plantar fasciitis    • Upper abdominal pain, unspecified    • Upper respiratory infection      Social History     Socioeconomic History   • Marital status:      Spouse name: Not on file   • Number of children: Not on file   • Years of education: Not on file   • Highest education level: Not on file   Tobacco Use   • Smoking status: Never Smoker   • Smokeless tobacco: Never Used   Substance and Sexual Activity   • Alcohol use: Yes     Comment: social   • Drug use: No   • Sexual activity: Defer     Family History   Problem Relation Age of Onset   • Osteoporosis Mother    • Diabetes Mother    • Hypertension Mother    • Diabetes Father    • Hypertension Father    • Hypertension Maternal Grandfather    • Hypertension Paternal Grandmother    • Thyroid disease Paternal Grandmother    • Heart disease Paternal Grandfather    • Hypertension Paternal Grandfather          Review of Systems:  Constitutional: negative  Eyes: negative  Ears, nose, mouth, throat, and face: negative  Respiratory: negative  Cardiovascular: negative  Gastrointestinal: negative  Genitourinary:negative  Integument/breast: negative  Hematologic/lymphatic: negative  Musculoskeletal:negative  Neurological: negative  Behavioral/Psych: positive for anxiety and depression  Endocrine: negative  Allergic/Immunologic: negative Patient advised to discuss any positive ROS with PCP.      [place] - 13      Vitals:    05/20/21 1328   BP: 105/76   Pulse: 105   SpO2: 97%           05/20/21  1328   Weight: 103 kg (226 lb)       Body mass index is 41.33 kg/m². Patient's Body mass index is 41.33 kg/m². indicating that she is morbidly obese (BMI > 40 or > 35 with obesity - related health condition). Obesity-related health conditions include the following: hypertension. Obesity is unchanged. BMI is is above average; BMI management plan is completed. We discussed portion control and increasing exercise.      Neck circumference: 17\"          General: Alert. " Cooperative. Well developed. No acute distress.             Head:  Normocephalic. Symmetrical. Atraumatic.              Eyes: Sclera clear. No icterus. PERRLA. Normal EOM.             Ears: No deformities. Normal hearing.             Nose: No septal deviation. No drainage.          Throat: No oral lesions. No thrush. Moist mucous membranes. Trachea midline    Tongue is normal    Dentition is fair       Pharynx: Posterior pharyngeal pillars are narrow    Mallampati score of IV (only hard palate visible)    Pharynx is nonerythematous   Chest Wall:  Normal shape. Symmetric expansion with respiration. No tenderness.          Lungs:  Clear to auscultation bilaterally. No wheezes. No rhonchi. No rales. Respirations regular, even and unlabored.            Heart:  Regular rhythm and normal rate. Normal S1 and S2. No murmur.  Extremities:  Moves all extremities well. No edema.           Pulses: Pulses palpable and equal bilaterally.               Skin: Dry. Intact. No bleeding. No rash.           Neuro: Moves all 4 extremities and cranial nerves grossly intact.  Psychiatric: Normal mood and affect.      Current Outpatient Medications:   •  albuterol sulfate  (90 Base) MCG/ACT inhaler, Inhale 2 puffs Every 6 (Six) Hours As Needed for bronchospasm., Disp: 18 g, Rfl: 5  •  albuterol sulfate  (90 Base) MCG/ACT inhaler, Inhale 1 to 2 puffs Every 4 (Four) to 6 (Six) Hours As Needed., Disp: 18 g, Rfl: 2  •  buPROPion SR (WELLBUTRIN SR) 200 MG 12 hr tablet, Take 1 tablet by mouth every morning., Disp: 90 tablet, Rfl: 0  •  cetirizine (zyrTEC) 10 MG tablet, Take 1 tablet by mouth Daily., Disp: 30 tablet, Rfl: 2  •  esomeprazole (nexIUM) 40 MG capsule, Take 1 capsule by mouth Daily., Disp: 90 capsule, Rfl: 1  •  esomeprazole (nexIUM) 40 MG capsule, Take 1 capsule by mouth Daily., Disp: 90 capsule, Rfl: 3  •  ezetimibe (ZETIA) 10 MG tablet, Take 1 tablet by mouth Daily., Disp: 30 tablet, Rfl: 2  •  ezetimibe (ZETIA) 10 MG  tablet, Take 1 tablet by mouth Daily., Disp: 90 tablet, Rfl: 1  •  fluticasone (FLONASE) 50 MCG/ACT nasal spray, Instill 1 spray into each nostril once Daily., Disp: 16 g, Rfl: 2  •  fluticasone (FLONASE) 50 MCG/ACT nasal spray, Instil 1 to 2 puffs in the nostrils as directed once daily., Disp: 16 g, Rfl: 2  •  hydroCHLOROthiazide (HYDRODIURIL) 12.5 MG tablet, Take 1 tablet by mouth Daily., Disp: 30 tablet, Rfl: 5  •  hydrOXYzine pamoate (VISTARIL) 25 MG capsule, Take 1 capsule by mouth Every 8 (Eight) Hours As Needed for breakthrough anxiety., Disp: 90 capsule, Rfl: 5  •  hydrOXYzine pamoate (VISTARIL) 25 MG capsule, Take 1 capsule by mouth every 8 (eight) hours as needed., Disp: 90 capsule, Rfl: 2  •  hydrOXYzine pamoate (VISTARIL) 25 MG capsule, Take 1 capsule by mouth Every 8 (Eight) Hours As Needed., Disp: 90 capsule, Rfl: 2  •  LORazepam (ATIVAN) 0.5 MG tablet, Take 1 tablet by mouth Daily As Needed for anxiety., Disp: 30 tablet, Rfl: 0  •  losartan (COZAAR) 25 MG tablet, Take 1 tablet by mouth daily., Disp: 90 tablet, Rfl: 0  •  meloxicam (MOBIC) 15 MG tablet, Take 1 tablet by mouth Daily **Take with food**, Disp: 21 tablet, Rfl: 0  •  methylPREDNISolone (MEDROL, KIESHA,) 4 MG tablet, Take as directed on package with food., Disp: 21 tablet, Rfl: 0  •  montelukast (SINGULAIR) 10 MG tablet, Take 1 tablet by mouth Daily., Disp: 30 tablet, Rfl: 4  •  montelukast (SINGULAIR) 10 MG tablet, Take 1 tablet by mouth Daily., Disp: 90 tablet, Rfl: 1  •  montelukast (SINGULAIR) 10 MG tablet, Take 1 tablet by mouth daily, Disp: 90 tablet, Rfl: 3  •  pseudoephedrine (SUDAFED) 30 MG tablet, Take 2 tablets by mouth Every 6 (Six) Hours As Needed for Congestion., Disp: 30 tablet, Rfl: 0  •  sertraline (ZOLOFT) 50 MG tablet, Take 1 tablet by mouth daily., Disp: 90 tablet, Rfl: 0  •  sucralfate (CARAFATE) 1 g tablet, Take 1 tablet by mouth 4 (Four) Times a Day., Disp: 60 tablet, Rfl: 0  •  sucralfate (CARAFATE) 1 g tablet, Take 1  tablet by mouth 2 (Two) Times a Day on an empty stomach., Disp: 60 tablet, Rfl: 0  •  venlafaxine XR (EFFEXOR-XR) 37.5 MG 24 hr capsule, Take 1 capsule by mouth daily., Disp: 30 capsule, Rfl: 5  •  Vitamin D, Cholecalciferol, (CHOLECALCIFEROL) 400 units tablet, Take 1 tablet by mouth Daily., Disp: 30 tablet, Rfl: 3    Lab Results   Component Value Date    WBC 15.29 (H) 07/08/2020    HGB 14.6 07/08/2020    HCT 43.4 07/08/2020    MCV 87.0 07/08/2020     07/08/2020     Lab Results   Component Value Date    GLUCOSE 121 (H) 07/08/2020    CALCIUM 9.2 07/08/2020     07/08/2020    K 4.0 07/08/2020    CO2 25.0 07/08/2020     07/08/2020    BUN 16 07/08/2020    CREATININE 1.00 07/08/2020    EGFRIFNONA 60 (L) 07/08/2020    BCR 16.0 07/08/2020    ANIONGAP 11.0 07/08/2020     No results found for: INR, PROTIME  Lab Results   Component Value Date    TROPONINT <0.010 07/08/2020       No results found for: PHART, YQQ1VAL, PO2ART]    Contraindications to home sleep test: none    Assessment and Plan:    1. Excessive daytime sleepiness- New (to me), additional work-up planned (4)  1. Check HST  2. Sleepy driving tips   3. Good sleep hygiene   4. RTC in 2 weeks with study results   2.   Snoring- New to me, additional work-up planned    1.   As above         3.  Insomnia - New to me, additional work-up planned    1.  Vistaril as needed for sleep    2.  Good sleep hygiene       I obtained a brief history from the patient, reviewed the medical problems and current medications, and made medical decisions regarding treatment based on that information.   I spent 25 minutes caring for Asha on this date of service. This time includes time spent by me in the following activities: preparing for the visit, obtaining and/or reviewing a separately obtained history, performing a medically appropriate examination and/or evaluation, counseling and educating the patient/family/caregiver, ordering medications, tests, or procedures and  documenting information in the medical record. I answered all of the patient's questions and she verbalized understanding. Discussion involved sleep apnea, effects of untreated sleep apnea/chronic sleep loss, sleep study.            RTC 2 weeks after study for results.             This document has been electronically signed by ROXY Myers on May 20, 2021 13:34 CDT          This document has been electronically signed by ROXY Myers on May 20, 2021         CC: Julianne Garcia MD          No ref. provider found

## 2021-06-30 ENCOUNTER — HOSPITAL ENCOUNTER (OUTPATIENT)
Dept: SLEEP MEDICINE | Facility: HOSPITAL | Age: 46
Discharge: HOME | End: 2021-06-30
Admitting: NURSE PRACTITIONER

## 2021-06-30 DIAGNOSIS — G47.19 EXCESSIVE DAYTIME SLEEPINESS: ICD-10-CM

## 2021-06-30 DIAGNOSIS — R06.83 SNORING: ICD-10-CM

## 2021-06-30 PROCEDURE — 95800 SLP STDY UNATTENDED: CPT

## 2021-06-30 PROCEDURE — 95800 SLP STDY UNATTENDED: CPT | Performed by: INTERNAL MEDICINE

## 2021-07-06 ENCOUNTER — APPOINTMENT (OUTPATIENT)
Dept: SLEEP MEDICINE | Facility: HOSPITAL | Age: 46
End: 2021-07-06

## 2021-07-12 ENCOUNTER — OFFICE VISIT (OUTPATIENT)
Dept: SLEEP MEDICINE | Facility: HOSPITAL | Age: 46
End: 2021-07-12

## 2021-07-12 VITALS
SYSTOLIC BLOOD PRESSURE: 148 MMHG | WEIGHT: 228.1 LBS | HEART RATE: 97 BPM | DIASTOLIC BLOOD PRESSURE: 89 MMHG | BODY MASS INDEX: 41.97 KG/M2 | OXYGEN SATURATION: 97 % | HEIGHT: 62 IN

## 2021-07-12 DIAGNOSIS — G47.33 OBSTRUCTIVE SLEEP APNEA, ADULT: Primary | ICD-10-CM

## 2021-07-12 PROCEDURE — 99212 OFFICE O/P EST SF 10 MIN: CPT | Performed by: NURSE PRACTITIONER

## 2021-07-12 NOTE — PROGRESS NOTES
Sleep Clinic Follow-Up     CHIEF COMPLAINT: follow up sleep testing    LAST OV: 05/20/2021    HPI:  The patient is a 45 y.o. female.  I discussed the results of the recent home sleep study performed on 06/30/2021. Total presumed sleep time was 6 hrs 22 minutes.  The AHI/THELMA was 8.5. Mean SaO2 was 95% with a mikaela of 88%. Snoring was present.       INTERVAL MEDICAL HISTORY: No change since last office visit in regard to the patient's bedtime routine, medications, or diagnosis.    PAP Data:  Currently not on therapy   Mask type: mask per patient's choice   DME: [facility]      MEDICATIONS:   Current Outpatient Medications:   •  albuterol sulfate  (90 Base) MCG/ACT inhaler, Inhale 2 puffs Every 6 (Six) Hours As Needed for bronchospasm., Disp: 18 g, Rfl: 5  •  albuterol sulfate  (90 Base) MCG/ACT inhaler, Inhale 1 to 2 puffs Every 4 (Four) to 6 (Six) Hours As Needed., Disp: 18 g, Rfl: 2  •  buPROPion SR (WELLBUTRIN SR) 200 MG 12 hr tablet, Take 1 tablet by mouth every morning., Disp: 90 tablet, Rfl: 0  •  cetirizine (zyrTEC) 10 MG tablet, Take 1 tablet by mouth Daily., Disp: 30 tablet, Rfl: 2  •  esomeprazole (nexIUM) 40 MG capsule, Take 1 capsule by mouth Daily., Disp: 90 capsule, Rfl: 1  •  esomeprazole (nexIUM) 40 MG capsule, Take 1 capsule by mouth Daily., Disp: 90 capsule, Rfl: 3  •  ezetimibe (ZETIA) 10 MG tablet, Take 1 tablet by mouth Daily., Disp: 30 tablet, Rfl: 2  •  ezetimibe (ZETIA) 10 MG tablet, Take 1 tablet by mouth Daily., Disp: 90 tablet, Rfl: 1  •  fluticasone (FLONASE) 50 MCG/ACT nasal spray, Instill 1 spray into each nostril once Daily., Disp: 16 g, Rfl: 2  •  fluticasone (FLONASE) 50 MCG/ACT nasal spray, Instil 1 to 2 puffs in the nostrils as directed once daily., Disp: 16 g, Rfl: 2  •  hydroCHLOROthiazide (HYDRODIURIL) 12.5 MG tablet, Take 1 tablet by mouth Daily., Disp: 30 tablet, Rfl: 5  •  hydrOXYzine pamoate (VISTARIL) 25 MG capsule, Take 1 capsule by mouth Every 8 (Eight) Hours As  Needed for breakthrough anxiety., Disp: 90 capsule, Rfl: 5  •  hydrOXYzine pamoate (VISTARIL) 25 MG capsule, Take 1 capsule by mouth every 8 (eight) hours as needed., Disp: 90 capsule, Rfl: 2  •  hydrOXYzine pamoate (VISTARIL) 25 MG capsule, Take 1 capsule by mouth Every 8 (Eight) Hours As Needed., Disp: 90 capsule, Rfl: 2  •  LORazepam (ATIVAN) 0.5 MG tablet, Take 1 tablet by mouth Daily As Needed for anxiety., Disp: 30 tablet, Rfl: 0  •  losartan (COZAAR) 25 MG tablet, Take 1 tablet by mouth daily., Disp: 90 tablet, Rfl: 0  •  meloxicam (MOBIC) 15 MG tablet, Take 1 tablet by mouth Daily **Take with food**, Disp: 21 tablet, Rfl: 0  •  methylPREDNISolone (MEDROL, KIESHA,) 4 MG tablet, Take as directed on package with food., Disp: 21 tablet, Rfl: 0  •  montelukast (SINGULAIR) 10 MG tablet, Take 1 tablet by mouth Daily., Disp: 30 tablet, Rfl: 4  •  montelukast (SINGULAIR) 10 MG tablet, Take 1 tablet by mouth Daily., Disp: 90 tablet, Rfl: 1  •  montelukast (SINGULAIR) 10 MG tablet, Take 1 tablet by mouth daily, Disp: 90 tablet, Rfl: 3  •  pseudoephedrine (SUDAFED) 30 MG tablet, Take 2 tablets by mouth Every 6 (Six) Hours As Needed for Congestion., Disp: 30 tablet, Rfl: 0  •  sertraline (ZOLOFT) 50 MG tablet, Take 1 tablet by mouth daily., Disp: 90 tablet, Rfl: 0  •  sucralfate (CARAFATE) 1 g tablet, Take 1 tablet by mouth 4 (Four) Times a Day., Disp: 60 tablet, Rfl: 0  •  sucralfate (CARAFATE) 1 g tablet, Take 1 tablet by mouth 2 (Two) Times a Day on an empty stomach., Disp: 60 tablet, Rfl: 0  •  venlafaxine XR (EFFEXOR-XR) 37.5 MG 24 hr capsule, Take 1 capsule by mouth daily., Disp: 30 capsule, Rfl: 5  •  vitamin D (ERGOCALCIFEROL) 1.25 MG (59571 UT) capsule capsule, Take 1 capsule by mouth Every 7 (Seven) Days for 8 doses., Disp: 4 capsule, Rfl: 1  •  Vitamin D, Cholecalciferol, (CHOLECALCIFEROL) 400 units tablet, Take 1 tablet by mouth Daily., Disp: 30 tablet, Rfl: 3    PHYSICAL EXAM  Vitals:    07/12/21 0847   BP:  148/89   Pulse: 97   SpO2: 97%           07/12/21  0847   Weight: 103 kg (228 lb 1.6 oz)       Body mass index is 41.71 kg/m². Patient's Body mass index is 41.71 kg/m². indicating that she is morbidly obese (BMI > 40 or > 35 with obesity - related health condition). Obesity-related health conditions include the following: obstructive sleep apnea. Obesity is unchanged. BMI is is above average; BMI management plan is completed. We discussed portion control and increasing exercise..      Gen:  No acute distress heard in voice, alert, oriented  Eyes:    Moist conjunctiva, EOMI   Lungs:  Normal effort, non-labored breathing  Heart:  no lower extremity edema   Psych:  Appropriate affect   Neuro:  Cn2-12 appear intact     Assessment and Plan:    1. Mild obstructive sleep apnea - Established, stable (1)  1. Start on auto CPAP 5-20 cm H2O with mask fitting per DME and PAP supplies   2. Continue PAP as prescribed  3. Monitor compliance report   4. Return to clinic in 12 weeks with compliance report, or sooner if needed   2. Insomnia  1. Good sleep hygiene   2. Vistaril as needed       The sleep results were discussed in detail with the patient.  The risks of untreated sleep apnea were reviewed.  Treatment options for sleep apnea were discussed in detail. She wants to pursue PAP therapy. I counseled patient on PAP management, maintenance and compliance, sleep hygiene, including regular sleep wake schedule and stimulus control therapy, the importance of weight reduction, safe driving and abstaining from smoking and alcohol consumption, as well as other sedatives.       All of the patient's questions were answered. She states understanding and agreement with my assessment and plan as above.     I obtained a brief history from the patient, reviewed the medical problems and current medications, and made medical decisions regarding treatment based on that information. Total visit time 12 min, with total of 9 minutes spent  counseling patient regarding: PAP therapy, PAP compliance, PAP maintenance, Sleep hygiene and Study results.       Patient to follow up in 31-90 days with compliance report   She agrees to return sooner on a prn basis if sx change.           This document has been electronically signed by ROXY Myers on July 12, 2021 08:49 CDT            CC: Julianne Garcia MD          No ref. provider found

## 2021-07-26 PROBLEM — R00.2 INTERMITTENT PALPITATIONS: Status: ACTIVE | Noted: 2019-09-11

## 2021-07-26 PROBLEM — B97.89 VIRAL RESPIRATORY ILLNESS: Status: ACTIVE | Noted: 2021-07-26

## 2021-07-26 PROBLEM — J98.8 VIRAL RESPIRATORY ILLNESS: Status: ACTIVE | Noted: 2021-07-26

## 2021-07-26 PROCEDURE — 87635 SARS-COV-2 COVID-19 AMP PRB: CPT | Performed by: NURSE PRACTITIONER

## 2021-09-16 ENCOUNTER — OFFICE VISIT (OUTPATIENT)
Dept: BEHAVIORAL HEALTH | Facility: CLINIC | Age: 46
End: 2021-09-16

## 2021-09-16 DIAGNOSIS — F90.2 ATTENTION DEFICIT HYPERACTIVITY DISORDER, COMBINED TYPE: Primary | ICD-10-CM

## 2021-09-16 PROCEDURE — 90791 PSYCH DIAGNOSTIC EVALUATION: CPT | Performed by: PSYCHOLOGIST

## 2021-09-23 NOTE — PROGRESS NOTES
9/22/2021    Asha Alvarez, a 45 y.o. female, was seen today for initial appointment lasting 45 minutes.  10-10:45 am CST  Patient is referred by Julianne Garcia MD for an assessment related to ADHD.    She has been receiving services from [facility] since 2019.    SUBJECTIVE:  She is experiencing: procrastination of chores, inattention, low tolerance to stress, poor coping skills, easily overwhelmed, poor concentration, amotivation, and nervousness.    She was diagnosed ADHD when she was 10 years old.      She has been worried about COVID-19.      She received counseling at [facility] following her divorce in 2000.    She denied a history of emotional trauma.      FAMILY HISTORY:  ADHD- mother, son  MDD- father, paternal grandmother  Anxiety- father, paternal grandmother, sister, mother, paternal aunt, paternal cousin x3   Alcohol- maternal grandmother, maternal cousin x2  Drug- none    Her parents  in 2016.  The relationship produced 2 daughters ('74 and '80). Her mother worked at [employer] for 20 years.  Her father was a .      She  in 1997.  They  in 2000.  They had no children.    She remarried in 2009.  The relationship produced a son ('11).  She lives with her spouse and son.  Her spouse works at [employer].      She graduated form [employer] in 1993.  She graduated from [employer] in 2005.  She obtained a BS from [employer] in 2015.    She has worked at [employer] since 2000.    MENTAL STATUS:  The patient was appropriately dressed and groomed.  The patient’s speech was WNL (rate, volume, articulation, coherence, etc.).  The patient was oriented to time, place, and person.  The patient’s memory (remote and recent) was intact.  The patient’s attention and concentration were WNL.  The patient’s mood and affect were congruent.    The patient’s thought content did not appear to possess delusions or hallucinations. These results do not appear to be significantly influenced  by the effects of visual, auditory, or motor deficits, environmental/economic or cultural differences.  The patient denied SI/HI.      strengths: she is polite  weakness: she is unable to manage symptoms  short term goal: she will reduce symptoms from 14 x week to 1 x week  long term goal: she will eliminate symptoms     DIAGNOSIS:    ICD-10-CM ICD-9-CM   1. Attention deficit hyperactivity disorder, combined type  F90.2 314.01       ASSESSMENT PLAN:  She will complete the assessment.          This document has been electronically signed by Christiano Davis, PhD on September 22, 2021 21:05 CDT

## 2021-12-03 ENCOUNTER — OFFICE VISIT (OUTPATIENT)
Dept: SLEEP MEDICINE | Facility: HOSPITAL | Age: 46
End: 2021-12-03

## 2021-12-03 VITALS
HEART RATE: 94 BPM | SYSTOLIC BLOOD PRESSURE: 127 MMHG | BODY MASS INDEX: 42.51 KG/M2 | HEIGHT: 62 IN | DIASTOLIC BLOOD PRESSURE: 84 MMHG | OXYGEN SATURATION: 97 % | WEIGHT: 231 LBS

## 2021-12-03 DIAGNOSIS — G47.33 OBSTRUCTIVE SLEEP APNEA, ADULT: Primary | ICD-10-CM

## 2021-12-03 PROCEDURE — 99212 OFFICE O/P EST SF 10 MIN: CPT | Performed by: NURSE PRACTITIONER

## 2021-12-03 NOTE — PROGRESS NOTES
Sleep Clinic Follow Up    Date: 12/3/2021  Primary Care Provider: Julianne Garcia MD    Last office visit: 2021 (I reviewed this note)    CC: Follow up: GET started on CPAP, 31-90 day follow-up       Interim History:  Since the last visit:    1) mild GET -  Asha Alvarez has remained compliant with CPAP. She denies mask and machine issues, dry mouth, headaches, or pressures intolerance. She denies abnormal dreams, sleep paralysis, nasal congestion, URI sx.    Since starting on CPAP she reports more refreshing sleep, less daytime sleepiness and no longer napping. Overall reports she is doing well.     2) Patient denies RLS symptoms.         Sleep Testin. HST on 2021, AHI of 8.5   2. Currently on 5-20 cm H2O    PAP Data:    Time frame: 2021-2021   Compliance: 97 %  Average use on days used: 5hrs 42 min  Percent of days with usage greater than or equal to 4 hours: 80%  PAP range: 5-20 cm H2O  Average 90% pressure: 8.2 cmH2O  Leak: 10.5 L/ minutes  Average AHI: 0.7 events/hr  Mask type: Nasal mask  DME: [facility]    Bed time: 9287-3679  Sleep latency: 0-15 minutes  Number of times awakens during the night: 0-1  Wake time: 0600  Estimated total sleep time at night: 6 hours  Caffeine intake: 0 cups of coffee, 0 cups of tea, and 2 sodas per day  Alcohol intake: 0 drinks per week  Nap time: denies    Sleepiness with Driving: denies      Jackson Score -  7, was 13 prior to CPAP    Jackson Sleepiness Scale    How likely are you to doze off or fall asleep in the following situation, in contrast to feeling just tired?     Use the following scale to choose the most appropriate number for each situation:    0 = would never doze  1 = slight chance of dozing   2 = moderate chance of dozing   3 = high chance of dozing    It is important that you answer each question as best you can.      Situation       Chance of Dozing (0-3)    Sitting and reading            ___1____    Watching TV           ___2____    Sitting, inactive in a public place (e.g. a theatre or meeting)   ___0____    As a passenger in a car for an hour without break    ___1____    Lying down to rest in the afternoon, when circumstances permit  ___3____    Sitting and talking to someone      ___0____    Sitting quietly after a lunch without alcohol     ___0____    In a car, while stopped for a few minutes in traffic    ___0____              PMHx, FH, SH reviewed and pertinent changes are:  unchanged from last office visit on 07/12/2021      REVIEW OF SYSTEMS:   Negative for chest pain, SOA, fever, chills, cough, N/V/D, abdominal pain.    Smoking:none         Exam:  Vitals:    12/03/21 0915   BP: 127/84   Pulse: 94   SpO2: 97%           12/03/21 0915   Weight: 105 kg (231 lb)     Body mass index is 42.24 kg/m². Patient's Body mass index is 42.24 kg/m². indicating that she is morbidly obese (BMI > 40 or > 35 with obesity - related health condition). Obesity-related health conditions include the following: obstructive sleep apnea. Obesity is unchanged. BMI is is above average; BMI management plan is completed. We discussed portion control and increasing exercise..      Gen:                No distress, conversant, pleasant, appears stated age, alert, oriented  Eyes:               Anicteric sclera, moist conjunctiva, no lid lag                           EOMI   Lungs:             normal effort, non-labored breathing                            CV:                                            no lower extremity edema                 Psych:             Appropriate affect  Neuro:             CN 2-12 appear intact    Past Medical History:   Diagnosis Date   • Acute pharyngitis    • Allergic rhinitis    • Asthma    • Cough    • Depression    • Dysfunction of eustachian tube    • Generalized anxiety disorder    • H/O screening mammography    • High blood pressure    • History of mammography, screening     MAM[facility]GRAM SCREENING [facility] (jan  2016)   • Ingrown toenail    • Motion sickness    • Otalgia    • Panic disorder    • Plantar fasciitis    • Upper abdominal pain, unspecified    • Upper respiratory infection        Current Outpatient Medications:   •  albuterol sulfate  (90 Base) MCG/ACT inhaler, Inhale 1 to 2 puffs Every 4 (Four) to 6 (Six) Hours As Needed., Disp: 18 g, Rfl: 5  •  cetirizine (zyrTEC) 10 MG tablet, Take 1 tablet by mouth Daily., Disp: 30 tablet, Rfl: 2  •  esomeprazole (nexIUM) 40 MG capsule, Take 1 capsule by mouth Daily., Disp: 90 capsule, Rfl: 3  •  ezetimibe (ZETIA) 10 MG tablet, Take 1 tablet by mouth Daily., Disp: 90 tablet, Rfl: 1  •  ezetimibe (ZETIA) 10 MG tablet, Take 1 tablet by mouth daily, Disp: 30 tablet, Rfl: 0  •  fluticasone (FLONASE) 50 MCG/ACT nasal spray, Administer 1 to 2 puffs in the nostrils as directed once daily., Disp: 16 g, Rfl: 5  •  hydroCHLOROthiazide (HYDRODIURIL) 12.5 MG tablet, Take 1 tablet by mouth daily, Disp: 30 tablet, Rfl: 5  •  hydrOXYzine pamoate (VISTARIL) 25 MG capsule, Take 1 capsule by mouth Every 8 (Eight) Hours As Needed for breakthrough anxiety., Disp: 90 capsule, Rfl: 5  •  hydrOXYzine pamoate (VISTARIL) 25 MG capsule, Take 1 capsule by mouth Every 8 (Eight) Hours As Needed., Disp: 90 capsule, Rfl: 2  •  hydrOXYzine pamoate (VISTARIL) 25 MG capsule, Take 1 capsule by mouth Every 8 (Eight) Hours As Needed., Disp: 90 capsule, Rfl: 0  •  LORazepam (ATIVAN) 0.5 MG tablet, Take 1 tablet by mouth Daily As Needed for anxiety., Disp: 30 tablet, Rfl: 0  •  LORazepam (ATIVAN) 0.5 MG tablet, Take 1 tablet by mouth Daily As Needed for anxiety., Disp: 30 tablet, Rfl: 2  •  losartan (COZAAR) 25 MG tablet, Take 1 tablet by mouth daily., Disp: 90 tablet, Rfl: 0  •  losartan (COZAAR) 25 MG tablet, Take 1 tablet by mouth daily, Disp: 30 tablet, Rfl: 0  •  losartan (COZAAR) 25 MG tablet, Take 1 tablet by mouth daily, Disp: 30 tablet, Rfl: 0  •  losartan (COZAAR) 25 MG tablet, Take 1 tablet by  mouth daily, Disp: 30 tablet, Rfl: 5  •  meloxicam (MOBIC) 15 MG tablet, Take 1 tablet by mouth Daily **Take with food**, Disp: 21 tablet, Rfl: 0  •  montelukast (SINGULAIR) 10 MG tablet, Take 1 tablet by mouth daily, Disp: 90 tablet, Rfl: 3  •  promethazine-dextromethorphan (PROMETHAZINE-DM) 6.25-15 MG/5ML syrup, Take 5 mL by mouth At Night As Needed for Cough., Disp: 150 mL, Rfl: 0  •  pseudoephedrine (SUDAFED) 30 MG tablet, Take 2 tablets by mouth Every 6 (Six) Hours As Needed for Congestion., Disp: 30 tablet, Rfl: 0  •  venlafaxine XR (EFFEXOR-XR) 37.5 MG 24 hr capsule, Take 1 capsule by mouth daily., Disp: 30 capsule, Rfl: 5  •  venlafaxine XR (EFFEXOR-XR) 37.5 MG 24 hr capsule, Take 1 capsule by mouth daily, Disp: 30 capsule, Rfl: 2  •  Vitamin D, Cholecalciferol, (CHOLECALCIFEROL) 400 units tablet, Take 1 tablet by mouth Daily., Disp: 30 tablet, Rfl: 3      Assessment and Plan:     1. Obstructive sleep apnea  -Established, stable (1)  1. Compliant with PAP therapy  2. Continue PAP as prescribed  3. Script for PAP supplies  4. Drowsy driving tips- do not drive if feeling sleepy   5. Return to clinic in 6 months with compliance report unless change in symptoms in interim period            I spent 12 minutes caring for Asha on this date of service. This time includes time spent by me in the following activities: preparing for the visit, obtaining and/or reviewing a separately obtained history, performing a medically appropriate examination and/or evaluation, counseling and educating the patient/family/caregiver, ordering medications, tests, or procedures and documenting information in the medical record.           This document has been electronically signed by ROXY Myers on December 3, 2021 09:23 CST            CC: Julianne Garcia MD          No ref. provider found

## 2021-12-13 ENCOUNTER — OFFICE VISIT (OUTPATIENT)
Dept: BEHAVIORAL HEALTH | Facility: CLINIC | Age: 46
End: 2021-12-13

## 2021-12-13 DIAGNOSIS — F98.8 ATTENTION DEFICIT DISORDER PREDOMINANT INATTENTIVE TYPE: ICD-10-CM

## 2021-12-13 PROCEDURE — 96130 PSYCL TST EVAL PHYS/QHP 1ST: CPT | Performed by: PSYCHOLOGIST

## 2021-12-17 NOTE — PROGRESS NOTES
[facility]  [address] [place] [id number]  PHONE : [phone]  FAX: [phone]      DATE:  12/13/2021    PATIENT:   Asha Alvarez 1975                                 MEDICAL RECORD #:  [id number]  Chronological age: 46 y.o.   Date of Psychological Assessment:   Examiner: Christiano Davis, PhD   Licensed Psychologist    Tests Administered:  Filippo Brief Intelligence Test- Second Edition (KBIT-2)  Wide Range Achievement Test- Fifth Edition (WRAT-5)  Brown ADD Rating Scales (Brown)  Siddiqui Depression Inventory (BDI-2)  Siddiqui Anxiety inventory (THELMA)  Cody’s Incomplete Sentence Blank- Adult (RISB-A)  Clinical Interview and Review of Records    Identification and Referral Information:   Ms. Alvarez was referred by Julianne Garcia MD for an assessment related to ADHD.  She has been receiving services from [facility] since 2019.       Presenting Problem and Background Information:  Ms. Alvarez is experiencing: procrastination of chores, inattention, low tolerance to stress, poor coping skills, easily overwhelmed, poor concentration, amotivation, and nervousness.     She was diagnosed ADHD when she was 10 years old.       She received counseling at [facility] following her divorce in 2000.     She denied a history of emotional trauma.     Behavioral Observations:  Asha was alert and oriented to time, place, and person. Her thought content did not appear to possess delusions or hallucinations. These results do not appear to be significantly influenced by the effects of visual, auditory, or motor deficits, environmental/economic or cultural differences. The following results are thought to be valid.      Test Results:  The interpretive information in this report should be viewed as only one source of hypotheses and no decision should be based solely on this information. This data should be integrated with all other sources of information in reaching  professional decisions about the individual. This report is confidential and intended for use by qualified professionals only.    KBIT-2  The KBIT-2 is a brief, individually administered measure of verbal and nonverbal intelligence for children, adolescents, and adults, spanning the ages from 4 to 90 years.    Ms. Alvarez obtained an IQ Composite Standard Score of 93 which yielded a Percentile of 32 and places her in the Average range of intellectual functioning. A Percentile of 32 means that she scored as well as or better than 32 out of 100 peers in the sample population. At a 90% Confidence Interval her true IQ Score falls between 88 and 98.  Individuals with similar scores learn material at a similar rate compared to same age peers and require a similar degree of examples, repetition and guided practice as same-aged peers.    The 3 point difference between the Verbal Standard Score (95, Average, 37%ile 18.6 years age equivalent) and the Nonverbal Standard Score (92, Average, 30%ile, 11.8 years age equivalent) was not significant and suggests that her verbal reasoning abilities are equally developed compared to her verbal reasoning abilities.    WRAT-5   The WRAT-5 is a screening measure of academic achievement.  Ms. Alvarez graduated from [employer] in 1993.  She graduated from [employer] in 2005.  She obtained a BS from [employer] in 2015.  She has worked at [employer] since 2000.          The results of the WRAT-5 indicate she is performing at a Grade Score of 8.2 in Word Reading, with a Word Reading Subtest Standard Score of 85 and a Percentile Rank of 16.  On the Spelling Subtest, the examinee obtained a Standard Score of 97 (42%ile) and a Grade Score of 11.0. On the Math Computation Subtest, the examinee obtained a Standard Score of 89 (23%ile) and a Grade Score of 6.6.  On the Sentence Comprehension, the examinee obtained a Standard Score of 104 (61%ile) and a Grade Score of >12.9. On the Reading Composite the  examinemabel obtained a Standard Score of 94 (34%ile).        The scores on the WRAT-5 are commensurate with her cognitive ability.     Brown ADD Scales  The Brown ADD Scales help to assess a wide range of symptoms of executive function impairments associated with ADHD/ADD.  The Brown ADD Scales include 40 items that assess five clusters of ADHD-related executive function impairments.      Ms. Alvarez, her spouse (Mohan Alvarez), and her coworker (Joie Dsouza) completed the rating scales.  T-Scores 60 and above are considered clinically significant.  She obtained the following T-scores:      Activation Attention Effort Affect Memory Total Score   Self 60 88 81 98 82 87   Spouse  63 75 68 64 85 75   Coworker  87 84 93 89 85 94     The results of the Brown ADD Scales indicate symptoms of ADHD at home.  However, no data exists to establish the onset of symptoms.    BDI-2  The BDI-2 is a 21 item, self-report instrument for measuring the severity of depression in adults and adolescents aged 13 years or older. The BDI-2 was developed for the assessment of symptoms corresponding to criteria for diagnosing depressive disorders listed in the American Psychiatric Association's Diagnostic and Statistical Manual of Mental Disorders (DSM-IV-TR). Scores above 28 are considered “severe”, 20-28 are “moderate”, 14-19 are “mild”, and 0-13 are “minimal”.        Ms. Alvarez obtained a score of 27 on the BDI-2. This score falls in the “moderate” range of depressive symptoms.  She is endorsing clinically significant symptoms of depression.    THELMA  The Siddiqui Anxiety Inventory (THELMA) is a widely used 21-item self-report inventory used to assess anxiety levels in adults and adolescents. It has been used in multiple studies, including in treatment-outcome studies for individuals who have experienced traumas. The age range for the measure is from 17 to 80 years.        The THELMA discriminates between anxious and non-anxious groups.  The inventory  "contains 21 items rated from 0 to 3 by the taker, with a total possible score of 63 points. The items are experiences related to anxiety such as \"Fear of worst happening\" or \"Heart pounding/racing\".  Total scores 0-7 = minimal, 8-15 = mild, 16-25 = moderate, 26+ = severe.  Scores of 26 and above are potentially concerning levels of anxiety.    Ms. Alvarez obtained a score of 18 on the THELMA.  This score falls in the “moderate” level of anxiety symptoms.        RISB-A  Cody’s Incomplete Sentence Blank- Adult is a 40 item fill-in-the blank project test designed to gather psychological data in the assessment process.  The results of the RISB-A indicate sadness, conflict with peers, inattention, low tolerance to stress, inattention, poor organizational skills, and dislike in her marriage.      Diagnosis  Problems Addressed this Visit     None      Visit Diagnoses     Attention deficit disorder predominant inattentive type          Diagnoses       Codes Comments    Attention deficit disorder predominant inattentive type     ICD-10-CM: F98.8  ICD-9-CM: 314.00         Summary:   Ms. Alvarez was referred by Julianne Garcia MD for an assessment related to ADHD.  She has been receiving services from [facility] since 2019.         The results of the K-BIT-2 indicate that she is performing in the Average range (93 IQ Composite).  The results of the WRAT-5 indicate the following grade scores: 8.2 in Word Reading, 11.0 in Spelling, 6.6 in Math Computation, and >12.9 in Sentence Comprehension. The results of the Brown ADD indicate ADHD.  The results of the BDI-2 indicate “moderate” depression. The results of the THELMA indicate “moderate” anxiety.  The results of the RISB-A indicate sadness, conflict with peers, inattention, low tolerance to stress, inattention, poor organizational skills, and dislike in her marriage.      Recommendations:  It is the recommendation of the undersigned that Asha Alvarez receive:   1. ongoing " counseling as necessary to address ADHD  2. psychiatric treatment as needed  3. educational and vocational assistance as necessary     I spent 60 minutes in direct face to face contact with patient.  Greater than 50% of this time was spent counseling patient and discussing plan of care.            This document has been electronically signed by Christiano Davis, PhD on December 17, 2021 15:37 CST        Christiano Davis, PhD   Licensed Psychologist

## 2022-04-25 ENCOUNTER — TELEPHONE (OUTPATIENT)
Dept: FAMILY MEDICINE CLINIC | Facility: CLINIC | Age: 47
End: 2022-04-25

## 2022-05-26 ENCOUNTER — OFFICE VISIT (OUTPATIENT)
Dept: FAMILY MEDICINE CLINIC | Facility: CLINIC | Age: 47
End: 2022-05-26

## 2022-05-26 VITALS
BODY MASS INDEX: 42.84 KG/M2 | WEIGHT: 232.8 LBS | DIASTOLIC BLOOD PRESSURE: 72 MMHG | HEIGHT: 62 IN | OXYGEN SATURATION: 98 % | SYSTOLIC BLOOD PRESSURE: 114 MMHG | HEART RATE: 107 BPM

## 2022-05-26 DIAGNOSIS — B97.89 VIRAL RESPIRATORY ILLNESS: ICD-10-CM

## 2022-05-26 DIAGNOSIS — I10 PRIMARY HYPERTENSION: Primary | ICD-10-CM

## 2022-05-26 DIAGNOSIS — E66.01 MORBID (SEVERE) OBESITY DUE TO EXCESS CALORIES: ICD-10-CM

## 2022-05-26 DIAGNOSIS — K21.9 GASTROESOPHAGEAL REFLUX DISEASE WITHOUT ESOPHAGITIS: ICD-10-CM

## 2022-05-26 DIAGNOSIS — Z11.59 NEED FOR HEPATITIS C SCREENING TEST: ICD-10-CM

## 2022-05-26 DIAGNOSIS — F41.9 ANXIETY AND DEPRESSION: ICD-10-CM

## 2022-05-26 DIAGNOSIS — J98.8 VIRAL RESPIRATORY ILLNESS: ICD-10-CM

## 2022-05-26 DIAGNOSIS — E55.9 VITAMIN D DEFICIENCY: ICD-10-CM

## 2022-05-26 DIAGNOSIS — Z79.899 HIGH RISK MEDICATION USE: ICD-10-CM

## 2022-05-26 DIAGNOSIS — Z13.29 SCREENING FOR THYROID DISORDER: ICD-10-CM

## 2022-05-26 DIAGNOSIS — Z13.1 SCREENING FOR DIABETES MELLITUS: ICD-10-CM

## 2022-05-26 DIAGNOSIS — E78.00 PURE HYPERCHOLESTEROLEMIA: ICD-10-CM

## 2022-05-26 DIAGNOSIS — F32.A ANXIETY AND DEPRESSION: ICD-10-CM

## 2022-05-26 PROCEDURE — 99215 OFFICE O/P EST HI 40 MIN: CPT | Performed by: NURSE PRACTITIONER

## 2022-05-26 PROCEDURE — 96372 THER/PROPH/DIAG INJ SC/IM: CPT | Performed by: NURSE PRACTITIONER

## 2022-05-26 RX ORDER — EZETIMIBE 10 MG/1
10 TABLET ORAL DAILY
Qty: 30 TABLET | Refills: 0 | Status: SHIPPED | OUTPATIENT
Start: 2022-05-26 | End: 2022-06-20 | Stop reason: SDUPTHER

## 2022-05-26 RX ORDER — ALBUTEROL SULFATE 2.5 MG/3ML
2.5 SOLUTION RESPIRATORY (INHALATION) EVERY 6 HOURS PRN
Status: DISCONTINUED | OUTPATIENT
Start: 2022-05-26 | End: 2022-06-27

## 2022-05-26 RX ORDER — FLUTICASONE PROPIONATE 50 MCG
SPRAY, SUSPENSION (ML) NASAL
Qty: 16 G | Refills: 5 | Status: SHIPPED | OUTPATIENT
Start: 2022-05-26

## 2022-05-26 RX ORDER — LORAZEPAM 0.5 MG/1
0.5 TABLET ORAL DAILY PRN
Qty: 30 TABLET | Refills: 2 | Status: SHIPPED | OUTPATIENT
Start: 2022-05-26 | End: 2022-09-28 | Stop reason: SDUPTHER

## 2022-05-26 RX ORDER — HYDROCHLOROTHIAZIDE 12.5 MG/1
12.5 TABLET ORAL EVERY MORNING
Qty: 90 TABLET | Refills: 1 | Status: SHIPPED | OUTPATIENT
Start: 2022-05-26 | End: 2022-11-18 | Stop reason: SDUPTHER

## 2022-05-26 RX ORDER — ESOMEPRAZOLE MAGNESIUM 40 MG/1
40 CAPSULE, DELAYED RELEASE ORAL DAILY
Qty: 90 CAPSULE | Refills: 3 | Status: SHIPPED | OUTPATIENT
Start: 2022-05-26

## 2022-05-26 RX ORDER — TRIAMCINOLONE ACETONIDE 40 MG/ML
80 INJECTION, SUSPENSION INTRA-ARTICULAR; INTRAMUSCULAR ONCE
Status: COMPLETED | OUTPATIENT
Start: 2022-05-26 | End: 2022-05-26

## 2022-05-26 RX ORDER — VENLAFAXINE HYDROCHLORIDE 75 MG/1
75 CAPSULE, EXTENDED RELEASE ORAL DAILY
Qty: 30 CAPSULE | Refills: 1 | Status: SHIPPED | OUTPATIENT
Start: 2022-05-26 | End: 2022-07-26 | Stop reason: SDUPTHER

## 2022-05-26 RX ORDER — MONTELUKAST SODIUM 10 MG/1
10 TABLET ORAL DAILY
Qty: 90 TABLET | Refills: 3 | Status: SHIPPED | OUTPATIENT
Start: 2022-05-26

## 2022-05-26 RX ORDER — HYDROXYZINE PAMOATE 25 MG/1
25 CAPSULE ORAL EVERY 8 HOURS PRN
Qty: 270 CAPSULE | Refills: 2 | Status: SHIPPED | OUTPATIENT
Start: 2022-05-26

## 2022-05-26 RX ORDER — ALBUTEROL SULFATE 90 UG/1
1-2 AEROSOL, METERED RESPIRATORY (INHALATION) EVERY 4 HOURS PRN
Qty: 8.5 G | Refills: 5 | Status: SHIPPED | OUTPATIENT
Start: 2022-05-26

## 2022-05-26 RX ORDER — ERGOCALCIFEROL (VITAMIN D2) 10 MCG
400 TABLET ORAL DAILY
Qty: 30 TABLET | Refills: 3 | Status: SHIPPED | OUTPATIENT
Start: 2022-05-26

## 2022-05-26 RX ORDER — CETIRIZINE HYDROCHLORIDE 10 MG/1
10 TABLET ORAL DAILY
Qty: 30 TABLET | Refills: 2 | Status: SHIPPED | OUTPATIENT
Start: 2022-05-26

## 2022-05-26 RX ADMIN — ALBUTEROL SULFATE 2.5 MG: 2.5 SOLUTION RESPIRATORY (INHALATION) at 14:16

## 2022-05-26 RX ADMIN — TRIAMCINOLONE ACETONIDE 80 MG: 40 INJECTION, SUSPENSION INTRA-ARTICULAR; INTRAMUSCULAR at 14:16

## 2022-05-26 NOTE — PROGRESS NOTES
"Chief Complaint  Establish Care    Subjective  Encounter toe establish care. Has anxiety , depression, high cholesterol, hypertension and GERD.  Was seeing Dr. Julianne Garcia. Has seasonal allergies \"this happens every year. I currently use my inhaler when needed.\" Wants something for weight loss as but does not want phentermine would like an injectable like Ozempic if possible.        Asha Alvarez presents to [facility] [place]  Anxiety  Presents for initial visit. Onset was 1 to 6 months ago. The problem has been waxing and waning. Symptoms include irritability, nervous/anxious behavior and panic. Patient reports no palpitations or suicidal ideas. Symptoms occur most days. The symptoms are aggravated by work stress. Nighttime awakenings: occasional.     Her past medical history is significant for anxiety/panic attacks and depression. There is no history of suicide attempts. Past treatments include benzodiazephines. The treatment provided moderate relief. Compliance with prior treatments has been good.   Depression  Visit Type: initial  Onset of symptoms: 1 to 6 months ago  Progression since onset: gradually worsening  Patient presents with the following symptoms: irritability, nervousness/anxiety and panic.  Patient is not experiencing: palpitations and suicidal ideas.  Frequency of symptoms: constantly   Severity: moderate   Sleep quality: fair  Nighttime awakenings: one to two  Patient has a history of: anxiety/panic attacks and depression  No history of: suicide attempt  Treatment tried: benzodiazepine  Compliance with treatment: good  Improvement on treatment: mild      Hyperlipidemia  This is a chronic problem. The current episode started more than 1 year ago. Exacerbating diseases include obesity. Factors aggravating her hyperlipidemia include fatty foods and thiazides. Current antihyperlipidemic treatment includes ezetimibe. The current treatment provides " "mild improvement of lipids. There are no compliance problems.  Risk factors for coronary artery disease include hypertension, a sedentary lifestyle, stress and dyslipidemia.   Heartburn  She complains of heartburn. This is a chronic problem. The current episode started more than 1 year ago. The problem occurs occasionally. The problem has been rapidly improving. The symptoms are aggravated by certain foods and lying down. Risk factors include obesity and lack of exercise. She has tried a PPI for the symptoms. The treatment provided moderate relief.   Hypertension  This is a chronic problem. The current episode started more than 1 year ago. The problem has been rapidly improving since onset. The problem is controlled. Associated symptoms include anxiety. Pertinent negatives include no palpitations. Risk factors for coronary artery disease include dyslipidemia, stress and sedentary lifestyle. Current antihypertension treatment includes diuretics and angiotensin blockers. The current treatment provides significant improvement. There are no compliance problems.    Obesity  This is a chronic problem. The current episode started more than 1 year ago. The problem occurs constantly. The problem has been unchanged. The symptoms are aggravated by drinking and eating. Treatments tried: portion contorl. increase exercising. The treatment provided mild relief.       Current Outpatient Medications on File Prior to Visit   Medication Sig Dispense Refill   • Blood Glucose Monitoring Suppl (FreeStyle Freedom Lite) w/Device kit Use to check blood sugar once daily. 1 each 0   • [DISCONTINUED] losartan (COZAAR) 25 MG tablet Take 1 tablet by mouth daily 90 tablet 1     No current facility-administered medications on file prior to visit.     Allergies   Allergen Reactions   • Codeine Hallucinations   • Lisinopril Cough       Objective   Vital Signs:  /72   Pulse 107   Ht 157.5 cm (62\")   Wt 106 kg (232 lb 12.8 oz)   SpO2 98%   " BMI 42.58 kg/m²   Class 3 Severe Obesity (BMI >=40). Obesity-related health conditions include the following: hypertension, dyslipidemias and GERD. Obesity is unchanged. BMI is is above average; BMI management plan is completed. We discussed portion control, increasing exercise and pharmacologic options including Saxenda.      Physical Exam  Vitals and nursing note reviewed.   Constitutional:       Appearance: She is well-developed.   HENT:      Head: Normocephalic.      Right Ear: Tympanic membrane, ear canal and external ear normal.      Left Ear: Tympanic membrane, ear canal and external ear normal.      Nose: Nose normal.   Eyes:      Extraocular Movements: Extraocular movements intact.      Conjunctiva/sclera: Conjunctivae normal.      Pupils: Pupils are equal, round, and reactive to light.   Cardiovascular:      Rate and Rhythm: Normal rate and regular rhythm.      Pulses: Normal pulses.      Heart sounds: Normal heart sounds.   Pulmonary:      Effort: Pulmonary effort is normal.      Breath sounds: Examination of the right-upper field reveals wheezing. Examination of the left-upper field reveals wheezing. Examination of the right-lower field reveals wheezing. Examination of the left-lower field reveals wheezing. Wheezing present.   Abdominal:      General: Bowel sounds are normal.      Palpations: Abdomen is soft.   Musculoskeletal:         General: Normal range of motion.      Cervical back: Normal range of motion and neck supple.   Skin:     General: Skin is warm.      Capillary Refill: Capillary refill takes less than 2 seconds.   Neurological:      Mental Status: She is alert and oriented to person, place, and time.   Psychiatric:         Behavior: Behavior normal.        Result Review :                   Assessment and Plan    Diagnoses and all orders for this visit:    1. Primary hypertension (Primary)  -     CBC & Differential; Future  -     Comprehensive Metabolic Panel; Future  -      hydroCHLOROthiazide (HYDRODIURIL) 12.5 MG tablet; Take 1 tablet by mouth Every Morning.  Dispense: 90 tablet; Refill: 1    2. Anxiety and depression  -     LORazepam (ATIVAN) 0.5 MG tablet; Take 1 tablet by mouth Daily As Needed for anxiety.  Dispense: 30 tablet; Refill: 2  -     hydrOXYzine pamoate (VISTARIL) 25 MG capsule; Take 1 capsule by mouth Every 8 (Eight) Hours As Needed for Anxiety.  Dispense: 270 capsule; Refill: 2  -     venlafaxine XR (Effexor XR) 75 MG 24 hr capsule; Take 1 capsule by mouth Daily.  Dispense: 30 capsule; Refill: 1    3. Pure hypercholesterolemia  -     Lipid Panel; Future  -     ezetimibe (ZETIA) 10 MG tablet; Take 1 tablet by mouth daily  Dispense: 30 tablet; Refill: 0    4. Gastroesophageal reflux disease without esophagitis  -     esomeprazole (nexIUM) 40 MG capsule; Take 1 capsule by mouth Daily.  Dispense: 90 capsule; Refill: 3    5. Morbid (severe) obesity due to excess calories (HCC)    6. Viral respiratory illness  -     albuterol (PROVENTIL) nebulizer solution 0.083% 2.5 mg/3mL  -     cetirizine (zyrTEC) 10 MG tablet; Take 1 tablet by mouth Daily.  Dispense: 30 tablet; Refill: 2  -     albuterol sulfate  (90 Base) MCG/ACT inhaler; Inhale 1 to 2 puffs Every 4 (Four) to 6 (Six) Hours As Needed.  Dispense: 8.5 g; Refill: 5  -     fluticasone (FLONASE) 50 MCG/ACT nasal spray; Administer 1 to 2 puffs in the nostrils as directed once daily.  Dispense: 16 g; Refill: 5  -     montelukast (SINGULAIR) 10 MG tablet; Take 1 tablet by mouth daily  Dispense: 90 tablet; Refill: 3  -     triamcinolone acetonide (KENALOG-40) injection 80 mg    7. Vitamin D deficiency  -     Vitamin D, Cholecalciferol, (CHOLECALCIFEROL) 10 MCG (400 UNIT) tablet; Take 1 tablet by mouth Daily.  Dispense: 30 tablet; Refill: 3    8. Screening for thyroid disorder  -     TSH; Future    9. Screening for diabetes mellitus  -     Hemoglobin A1c; Future    10. Need for hepatitis C screening test  -     Hepatitis C  antibody; Future    11. High risk medication use  -     Urine Drug Screen - Urine, Clean Catch; Future      1. Primary hypertension (Primary)   Complete CBC and CMP as ordered will notify when results are available  Continue taking hydrochlorothiazide as previously ordered  Continue taking losartan as previously ordered  Educated to decrease sodium intake to no more than 1500 mg a day    2.  Anxiety and depression  Continue taking Ativan as previously prescribed  Continue taking Vistaril as previously prescribed  Continue taking Effexor as previously prescribed   Seek emergency medical treatment for any new or worsening thoughts of self-harm hopelessness or helplessness    3.  Pure hypercholesterolemia  Continue taking Zetia as previously prescribed  Educated to decrease saturated fats in the diet  Complete fasting lipid panel as ordered will notify when results are available     4.  Gastroesophageal reflux disease without esophagitis   Continue taking Nexium as previously prescribed     5.  Morbid obesity:  Counseled fariha importance of healthy eating habits   Discussed possible pharmacological intravenations including Wegovy and Saxenda  Reduce caloric intake to no more than 1200 per day   Increase physical acitivy regimen to a minimum of 150 minutes/week  Body mass index is 42.58 kg/m².    6.  Viral respiratory illness  Kenalog shot given in office today  Educated on possible side effects including but not limited to swelling and redness at the injection site  Albuterol neb treatment given in office today, lung sounds clear to auscultation after treatment  Continue taking zyrtec as previously prescribed   Continue taking Flonase as previously prescribed   Continue taking Singulair as previously prescribed   Continue with albuterol sulfate inhaler as needed as previously prescribed     7. Vitamin D deficiency  Continue taking Vitamin D as previously prescribed     8. Gastroesophageal reflux disease without  esophagitis   Continue taking Nexium as previously prescribed     9. Screening for thyroid disorder  Complete TSH as ordered will notify when results are available    9. Screening for diabetes mellitus  Complete hemoglobin A1c as ordered will notify when results are available    10.  Need for hep C screening test  Complete hepatitis screening test as ordered will notify when results are available    11.  High risk medication use  Complete UDS as ordered  PDMP reviewed  Controlled substance contract signed in office will scanned into medical records     I spent 45 minutes caring for Asha on this date of service. This time includes time spent by me in the following activities:preparing for the visit, reviewing tests, obtaining and/or reviewing a separately obtained history, performing a medically appropriate examination and/or evaluation , ordering medications, tests, or procedures, documenting information in the medical record and care coordination  Follow Up   Return in about 4 weeks (around 6/23/2022) for Annual physical.  Patient was given instructions and counseling regarding her condition or for health maintenance advice. Please see specific information pulled into the AVS if appropriate.         This document has been electronically signed by ROXY Gomes on May 27, 2022 10:07 CDT

## 2022-05-27 ENCOUNTER — LAB (OUTPATIENT)
Dept: LAB | Facility: HOSPITAL | Age: 47
End: 2022-05-27

## 2022-05-27 DIAGNOSIS — Z13.29 SCREENING FOR THYROID DISORDER: ICD-10-CM

## 2022-05-27 DIAGNOSIS — Z79.899 HIGH RISK MEDICATION USE: ICD-10-CM

## 2022-05-27 DIAGNOSIS — Z11.59 NEED FOR HEPATITIS C SCREENING TEST: ICD-10-CM

## 2022-05-27 DIAGNOSIS — E78.00 PURE HYPERCHOLESTEROLEMIA: ICD-10-CM

## 2022-05-27 DIAGNOSIS — Z13.1 SCREENING FOR DIABETES MELLITUS: ICD-10-CM

## 2022-05-27 DIAGNOSIS — I10 PRIMARY HYPERTENSION: ICD-10-CM

## 2022-05-27 LAB
ALBUMIN SERPL-MCNC: 4.4 G/DL (ref 3.5–5.2)
ALBUMIN/GLOB SERPL: 1.8 G/DL
ALP SERPL-CCNC: 98 U/L (ref 39–117)
ALT SERPL W P-5'-P-CCNC: 24 U/L (ref 1–33)
AMPHET+METHAMPHET UR QL: NEGATIVE
AMPHETAMINES UR QL: NEGATIVE
ANION GAP SERPL CALCULATED.3IONS-SCNC: 12.8 MMOL/L (ref 5–15)
AST SERPL-CCNC: 18 U/L (ref 1–32)
BARBITURATES UR QL SCN: NEGATIVE
BASOPHILS # BLD AUTO: 0.04 10*3/MM3 (ref 0–0.2)
BASOPHILS NFR BLD AUTO: 0.3 % (ref 0–1.5)
BENZODIAZ UR QL SCN: NEGATIVE
BILIRUB SERPL-MCNC: 0.4 MG/DL (ref 0–1.2)
BUN SERPL-MCNC: 15 MG/DL (ref 6–20)
BUN/CREAT SERPL: 19.5 (ref 7–25)
BUPRENORPHINE SERPL-MCNC: NEGATIVE NG/ML
CALCIUM SPEC-SCNC: 9.3 MG/DL (ref 8.6–10.5)
CANNABINOIDS SERPL QL: NEGATIVE
CHLORIDE SERPL-SCNC: 100 MMOL/L (ref 98–107)
CHOLEST SERPL-MCNC: 126 MG/DL (ref 0–200)
CO2 SERPL-SCNC: 25.2 MMOL/L (ref 22–29)
COCAINE UR QL: NEGATIVE
CREAT SERPL-MCNC: 0.77 MG/DL (ref 0.57–1)
DEPRECATED RDW RBC AUTO: 43.8 FL (ref 37–54)
EGFRCR SERPLBLD CKD-EPI 2021: 95.9 ML/MIN/1.73
EOSINOPHIL # BLD AUTO: 0.06 10*3/MM3 (ref 0–0.4)
EOSINOPHIL NFR BLD AUTO: 0.4 % (ref 0.3–6.2)
ERYTHROCYTE [DISTWIDTH] IN BLOOD BY AUTOMATED COUNT: 13.7 % (ref 12.3–15.4)
GLOBULIN UR ELPH-MCNC: 2.5 GM/DL
GLUCOSE SERPL-MCNC: 137 MG/DL (ref 65–99)
HBA1C MFR BLD: 6.3 % (ref 4.8–5.6)
HCT VFR BLD AUTO: 42.8 % (ref 34–46.6)
HCV AB SER DONR QL: NORMAL
HDLC SERPL-MCNC: 50 MG/DL (ref 40–60)
HGB BLD-MCNC: 14 G/DL (ref 12–15.9)
IMM GRANULOCYTES # BLD AUTO: 0.08 10*3/MM3 (ref 0–0.05)
IMM GRANULOCYTES NFR BLD AUTO: 0.5 % (ref 0–0.5)
LDLC SERPL CALC-MCNC: 54 MG/DL (ref 0–100)
LDLC/HDLC SERPL: 1.01 {RATIO}
LYMPHOCYTES # BLD AUTO: 1.91 10*3/MM3 (ref 0.7–3.1)
LYMPHOCYTES NFR BLD AUTO: 13.1 % (ref 19.6–45.3)
MCH RBC QN AUTO: 28.6 PG (ref 26.6–33)
MCHC RBC AUTO-ENTMCNC: 32.7 G/DL (ref 31.5–35.7)
MCV RBC AUTO: 87.3 FL (ref 79–97)
METHADONE UR QL SCN: NEGATIVE
MONOCYTES # BLD AUTO: 0.63 10*3/MM3 (ref 0.1–0.9)
MONOCYTES NFR BLD AUTO: 4.3 % (ref 5–12)
NEUTROPHILS NFR BLD AUTO: 11.83 10*3/MM3 (ref 1.7–7)
NEUTROPHILS NFR BLD AUTO: 81.4 % (ref 42.7–76)
NRBC BLD AUTO-RTO: 0 /100 WBC (ref 0–0.2)
OPIATES UR QL: NEGATIVE
OXYCODONE UR QL SCN: NEGATIVE
PCP UR QL SCN: NEGATIVE
PLATELET # BLD AUTO: 283 10*3/MM3 (ref 140–450)
PMV BLD AUTO: 12.4 FL (ref 6–12)
POTASSIUM SERPL-SCNC: 3.8 MMOL/L (ref 3.5–5.2)
PROPOXYPH UR QL: NEGATIVE
PROT SERPL-MCNC: 6.9 G/DL (ref 6–8.5)
RBC # BLD AUTO: 4.9 10*6/MM3 (ref 3.77–5.28)
SODIUM SERPL-SCNC: 138 MMOL/L (ref 136–145)
TRICYCLICS UR QL SCN: NEGATIVE
TRIGL SERPL-MCNC: 127 MG/DL (ref 0–150)
TSH SERPL DL<=0.05 MIU/L-ACNC: 1.36 UIU/ML (ref 0.27–4.2)
VLDLC SERPL-MCNC: 22 MG/DL (ref 5–40)
WBC NRBC COR # BLD: 14.55 10*3/MM3 (ref 3.4–10.8)

## 2022-05-27 PROCEDURE — 80061 LIPID PANEL: CPT

## 2022-05-27 PROCEDURE — 80306 DRUG TEST PRSMV INSTRMNT: CPT

## 2022-05-27 PROCEDURE — 83036 HEMOGLOBIN GLYCOSYLATED A1C: CPT

## 2022-05-27 PROCEDURE — 80050 GENERAL HEALTH PANEL: CPT

## 2022-05-27 PROCEDURE — 86803 HEPATITIS C AB TEST: CPT

## 2022-05-30 DIAGNOSIS — R73.03 PREDIABETES: Primary | ICD-10-CM

## 2022-05-31 ENCOUNTER — TELEPHONE (OUTPATIENT)
Dept: FAMILY MEDICINE CLINIC | Facility: CLINIC | Age: 47
End: 2022-05-31

## 2022-05-31 NOTE — PROGRESS NOTES
Per ROXY Mayen, Ms. Burns has been called with recent lab results & recommendations.  Continue current medications and follow-up as planned or sooner if any problems.

## 2022-05-31 NOTE — TELEPHONE ENCOUNTER
Per ROXY Mayen, Ms. Burns has been called with recent lab results & recommendations.  Continue current medications and follow-up as planned or sooner if any problems.       ----- Message from ROXY Gomes sent at 5/30/2022 11:44 AM CDT -----  Fasting glucose and hemoglobin A1C are elevated.  This is considered prediabetes.  I am going to order Ozempic to help lower her blood sugars.  WBC elevated and has been over the last several checks.  Has she ever had this evaluated?  All other labs were essentially normal.

## 2022-06-03 ENCOUNTER — OFFICE VISIT (OUTPATIENT)
Dept: SLEEP MEDICINE | Facility: HOSPITAL | Age: 47
End: 2022-06-03

## 2022-06-03 VITALS
WEIGHT: 232.2 LBS | HEIGHT: 62 IN | OXYGEN SATURATION: 97 % | DIASTOLIC BLOOD PRESSURE: 76 MMHG | BODY MASS INDEX: 42.73 KG/M2 | HEART RATE: 86 BPM | SYSTOLIC BLOOD PRESSURE: 128 MMHG

## 2022-06-03 DIAGNOSIS — G47.33 OSA (OBSTRUCTIVE SLEEP APNEA): Primary | ICD-10-CM

## 2022-06-03 PROCEDURE — 99212 OFFICE O/P EST SF 10 MIN: CPT | Performed by: NURSE PRACTITIONER

## 2022-06-03 RX ORDER — LOSARTAN POTASSIUM 25 MG/1
25 TABLET ORAL DAILY
Qty: 90 TABLET | Refills: 3 | Status: SHIPPED | OUTPATIENT
Start: 2022-06-03

## 2022-06-03 NOTE — PROGRESS NOTES
Sleep Clinic Follow Up    Date: 6/3/2022  Primary Care Provider: Tiarra Pettit APRN    Last office visit: 2021 (I reviewed this note)    CC: Follow up: GET started on CPAP, 6 month check      Interim History:  Since the last visit:    1) mild GET -  Asha Alvarez has remained compliant with CPAP. She denies mask and machine issues, dry mouth, headaches, or pressures intolerance. She denies abnormal dreams, sleep paralysis, nasal congestion, URI sx.  Some nights she falls asleep on the couch and does not go to bed until later on.  Some nights she gets up to the bathroom and does not put CPAP back on.     Overall doing well. Has no complaints.     2) Patient denies RLS symptoms.     Sleep Testin. HST on 2021, AHI of 8.5   2. Currently on 5-20 cm H2O    PAP Data:    Time frame: 01/10/2022-2022   Compliance: 93 %  Average use on days used: 5hrs 23 min  Percent of days with usage greater than or equal to 4 hours: 65%  PAP range: 5-20 cm H2O  Average 90% pressure: 8 cmH2O  Leak: 6.3 L/ minutes  Average AHI: 0.7 events/hr  Mask type: Nasal mask  DME: [facility]    Bed time: 2200  Sleep latency: 10-15 minutes  Number of times awakens during the night: 1-2  Wake time: 0630  Estimated total sleep time at night: 6 hours  Caffeine intake: 0 cups of coffee, 0 cups of tea, and 2 sodas per day  Alcohol intake: 0 drinks per week  Nap time: denies    Sleepiness with Driving: denies      [place] - 5        PMHx, FH, SH reviewed and pertinent changes are:  unchanged from last office visit on 2021      REVIEW OF SYSTEMS:   Negative for chest pain, SOA, fever, chills, cough, N/V/D, abdominal pain.    Smoking:none         Exam:  Vitals:    22 08   BP: 128/76   Pulse: 86   SpO2: 97%           22   Weight: 105 kg (232 lb 3.2 oz)     Body mass index is 42.46 kg/m². Class 3 Severe Obesity (BMI >=40). Obesity-related health conditions include the following: obstructive sleep apnea. Obesity  is unchanged. BMI is is above average; BMI management plan is completed. We discussed portion control and increasing exercise.        Gen:                No distress, conversant, pleasant, appears stated age, alert, oriented  Eyes:               Anicteric sclera, moist conjunctiva, no lid lag                           EOMI   Lungs:             normal effort, non-labored breathing                          Clear to auscultation bilaterally          CV:                  Normal S1/S2, no murmur                          no lower extremity edema                 Psych:             Appropriate affect  Neuro:             CN 2-12 appear intact    Past Medical History:   Diagnosis Date   • Acute pharyngitis    • Allergic rhinitis    • Asthma    • Cough    • Depression    • Dysfunction of eustachian tube    • Generalized anxiety disorder    • H/O screening mammography    • High blood pressure    • History of mammography, screening     MAM[facility]GRAM SCREENING [facility] (jan 2016)   • Ingrown toenail    • Motion sickness    • Otalgia    • Panic disorder    • Plantar fasciitis    • Upper abdominal pain, unspecified    • Upper respiratory infection        Current Outpatient Medications:   •  albuterol sulfate  (90 Base) MCG/ACT inhaler, Inhale 1 to 2 puffs Every 4 (Four) to 6 (Six) Hours As Needed., Disp: 8.5 g, Rfl: 5  •  Blood Glucose Monitoring Suppl (FreeStyle Freedom Lite) w/Device kit, Use to check blood sugar once daily., Disp: 1 each, Rfl: 0  •  cetirizine (zyrTEC) 10 MG tablet, Take 1 tablet by mouth Daily., Disp: 30 tablet, Rfl: 2  •  esomeprazole (nexIUM) 40 MG capsule, Take 1 capsule by mouth Daily., Disp: 90 capsule, Rfl: 3  •  ezetimibe (ZETIA) 10 MG tablet, Take 1 tablet by mouth daily, Disp: 30 tablet, Rfl: 0  •  fluticasone (FLONASE) 50 MCG/ACT nasal spray, Administer 1 to 2 puffs in the nostrils as directed once daily., Disp: 16 g, Rfl: 5  •  hydroCHLOROthiazide (HYDRODIURIL) 12.5 MG tablet, Take 1  tablet by mouth Every Morning., Disp: 90 tablet, Rfl: 1  •  hydrOXYzine pamoate (VISTARIL) 25 MG capsule, Take 1 capsule by mouth Every 8 (Eight) Hours As Needed for Anxiety., Disp: 270 capsule, Rfl: 2  •  LORazepam (ATIVAN) 0.5 MG tablet, Take 1 tablet by mouth Daily As Needed for anxiety., Disp: 30 tablet, Rfl: 2  •  montelukast (SINGULAIR) 10 MG tablet, Take 1 tablet by mouth daily, Disp: 90 tablet, Rfl: 3  •  Semaglutide,0.25 or 0.5MG/DOS, (OZEMPIC) 2 MG/1.5ML solution pen-injector, Inject 0.25 mg under the skin into the appropriate area as directed 1 (One) Time Per Week. If tolerating after one month increase to 0.5 mg weekly, Disp: 5 pen, Rfl: 3  •  venlafaxine XR (Effexor XR) 75 MG 24 hr capsule, Take 1 capsule by mouth Daily., Disp: 30 capsule, Rfl: 1  •  Vitamin D, Cholecalciferol, (CHOLECALCIFEROL) 10 MCG (400 UNIT) tablet, Take 1 tablet by mouth Daily., Disp: 30 tablet, Rfl: 3    Current Facility-Administered Medications:   •  albuterol (PROVENTIL) nebulizer solution 0.083% 2.5 mg/3mL, 2.5 mg, Nebulization, Q6H PRN, Tiarra Pettit APRN, 2.5 mg at 05/26/22 1416      Assessment and Plan:    1. Obstructive sleep apnea    1. Compliant with PAP therapy- increase hours used per night   2. Continue PAP as prescribed  3. Script for PAP supplies  4. Drowsy driving tips- do not drive if feeling sleepy   5. Return to clinic in 12 months with compliance report unless change in symptoms in interim period  2. Morbid obesity              I spent 10 minutes caring for Asha on this date of service. This time includes time spent by me in the following activities: preparing for the visit, obtaining and/or reviewing a separately obtained history, performing a medically appropriate examination and/or evaluation, counseling and educating the patient/family/caregiver, ordering medications, tests, or procedures and documenting information in the medical record.           This document has been electronically signed by Dee SNYDER  ROXY Bejarano on Yoselin 3, 2022 08:18 CDT            CC: Tiarra Pettit APRN          No ref. provider found

## 2022-06-20 DIAGNOSIS — E78.00 PURE HYPERCHOLESTEROLEMIA: ICD-10-CM

## 2022-06-20 RX ORDER — EZETIMIBE 10 MG/1
10 TABLET ORAL DAILY
Qty: 30 TABLET | Refills: 0 | Status: SHIPPED | OUTPATIENT
Start: 2022-06-20 | End: 2022-07-25 | Stop reason: SDUPTHER

## 2022-06-27 ENCOUNTER — OFFICE VISIT (OUTPATIENT)
Dept: FAMILY MEDICINE CLINIC | Facility: CLINIC | Age: 47
End: 2022-06-27

## 2022-06-27 VITALS
HEIGHT: 62 IN | WEIGHT: 229.1 LBS | OXYGEN SATURATION: 99 % | DIASTOLIC BLOOD PRESSURE: 88 MMHG | SYSTOLIC BLOOD PRESSURE: 126 MMHG | BODY MASS INDEX: 42.16 KG/M2 | HEART RATE: 110 BPM

## 2022-06-27 DIAGNOSIS — Z00.00 ANNUAL PHYSICAL EXAM: Primary | ICD-10-CM

## 2022-06-27 DIAGNOSIS — E66.01 MORBID (SEVERE) OBESITY DUE TO EXCESS CALORIES: ICD-10-CM

## 2022-06-27 PROBLEM — E78.2 MIXED HYPERLIPIDEMIA: Status: ACTIVE | Noted: 2022-06-27

## 2022-06-27 PROBLEM — K21.9 GASTROESOPHAGEAL REFLUX DISEASE: Status: ACTIVE | Noted: 2022-06-27

## 2022-06-27 PROBLEM — I10 ESSENTIAL HYPERTENSION: Status: ACTIVE | Noted: 2022-06-27

## 2022-06-27 PROBLEM — J30.2 SEASONAL ALLERGIES: Status: ACTIVE | Noted: 2022-06-27

## 2022-06-27 PROBLEM — R89.9 UNSPECIFIED ABNORMAL FINDING IN SPECIMENS FROM OTHER ORGANS, SYSTEMS AND TISSUES: Status: ACTIVE | Noted: 2022-06-27

## 2022-06-27 PROBLEM — F41.8 MIXED ANXIETY AND DEPRESSIVE DISORDER: Status: ACTIVE | Noted: 2022-06-27

## 2022-06-27 PROBLEM — E78.6 LIPOPROTEIN DEFICIENCY DISORDER: Status: ACTIVE | Noted: 2022-06-27

## 2022-06-27 PROBLEM — J45.20 MILD INTERMITTENT ASTHMA: Status: ACTIVE | Noted: 2022-06-27

## 2022-06-27 PROBLEM — E88.81 METABOLIC SYNDROME: Status: ACTIVE | Noted: 2022-06-27

## 2022-06-27 PROCEDURE — 99396 PREV VISIT EST AGE 40-64: CPT | Performed by: NURSE PRACTITIONER

## 2022-06-27 RX ORDER — PHENTERMINE HYDROCHLORIDE 37.5 MG/1
37.5 TABLET ORAL
Qty: 30 TABLET | Refills: 2 | Status: SHIPPED | OUTPATIENT
Start: 2022-06-27 | End: 2023-01-04

## 2022-06-27 NOTE — PROGRESS NOTES
Chief Complaint  Annual Exam    Subjective  Annual physical exam.  Has been reducing her calorie intake and has lost 3 pounds in 3 weeks.  Would like to discuss further weight loss options.      Asha Alvarez presents to [facility] - [place]  HPI:  Annual physical exam     Obesity  This is a chronic problem. The current episode started more than 1 year ago. The problem occurs constantly. The problem has been unchanged. The symptoms are aggravated by drinking and eating. Treatments tried: portion contorl. increase exercising. The treatment provided mild relief.     Current Outpatient Medications on File Prior to Visit   Medication Sig Dispense Refill   • albuterol sulfate  (90 Base) MCG/ACT inhaler Inhale 1 to 2 puffs Every 4 (Four) to 6 (Six) Hours As Needed. 8.5 g 5   • Blood Glucose Monitoring Suppl (FreeStyle Freedom Lite) w/Device kit Use to check blood sugar once daily. 1 each 0   • cetirizine (zyrTEC) 10 MG tablet Take 1 tablet by mouth Daily. 30 tablet 2   • clotrimazole-betamethasone (LOTRISONE) 1-0.05 % cream Apply topically to the appropriate area as directed 2 (two) times daily. Rub in gently and completely. 45 g 1   • esomeprazole (nexIUM) 40 MG capsule Take 1 capsule by mouth Daily. 90 capsule 3   • ezetimibe (ZETIA) 10 MG tablet Take 1 tablet by mouth daily 30 tablet 0   • fluticasone (FLONASE) 50 MCG/ACT nasal spray Administer 1 to 2 puffs in the nostrils as directed once daily. 16 g 5   • hydroCHLOROthiazide (HYDRODIURIL) 12.5 MG tablet Take 1 tablet by mouth Every Morning. 90 tablet 1   • hydrOXYzine pamoate (VISTARIL) 25 MG capsule Take 1 capsule by mouth Every 8 (Eight) Hours As Needed for Anxiety. 270 capsule 2   • LORazepam (ATIVAN) 0.5 MG tablet Take 1 tablet by mouth Daily As Needed for anxiety. 30 tablet 2   • losartan (Cozaar) 25 MG tablet Take 1 tablet by mouth Daily. 90 tablet 3   • montelukast (SINGULAIR) 10 MG tablet Take 1  "tablet by mouth daily 90 tablet 3   • venlafaxine XR (Effexor XR) 75 MG 24 hr capsule Take 1 capsule by mouth Daily. 30 capsule 1   • Vitamin D, Cholecalciferol, (CHOLECALCIFEROL) 10 MCG (400 UNIT) tablet Take 1 tablet by mouth Daily. 30 tablet 3   • [DISCONTINUED] Semaglutide,0.25 or 0.5MG/DOS, (OZEMPIC) 2 MG/1.5ML solution pen-injector Inject 0.25 mg under the skin into the appropriate area as directed 1 (One) Time Per Week. If tolerating after one month increase to 0.5 mg weekly 5 pen 3     Current Facility-Administered Medications on File Prior to Visit   Medication Dose Route Frequency Provider Last Rate Last Admin   • [DISCONTINUED] albuterol (PROVENTIL) nebulizer solution 0.083% 2.5 mg/3mL  2.5 mg Nebulization Q6H PRN Tiarra Pettit APRN   2.5 mg at 05/26/22 1416     Allergies   Allergen Reactions   • Codeine Hallucinations   • Lisinopril Cough       Objective   Vital Signs:  /88   Pulse 110   Ht 157.5 cm (62.01\")   Wt 104 kg (229 lb 1.6 oz)   SpO2 99%   BMI 41.89 kg/m²   Estimated body mass index is 41.89 kg/m² as calculated from the following:    Height as of this encounter: 157.5 cm (62.01\").    Weight as of this encounter: 104 kg (229 lb 1.6 oz).          Physical Exam  Vitals and nursing note reviewed.   Constitutional:       Appearance: Normal appearance. She is well-developed. She is morbidly obese.   HENT:      Head: Normocephalic and atraumatic.      Right Ear: Tympanic membrane, ear canal and external ear normal.      Left Ear: Tympanic membrane, ear canal and external ear normal.   Eyes:      Pupils: Pupils are equal, round, and reactive to light.   Cardiovascular:      Rate and Rhythm: Normal rate and regular rhythm.      Heart sounds: Normal heart sounds.   Pulmonary:      Effort: Pulmonary effort is normal.      Breath sounds: Normal breath sounds.   Chest:      Comments: Breast exam deferred to gynecologist  Abdominal:      General: Bowel sounds are normal.      Palpations: Abdomen " is soft.   Genitourinary:     Comments: Pelvic exam deferred to gynecologist  Musculoskeletal:         General: Normal range of motion.      Cervical back: Normal range of motion and neck supple.   Skin:     General: Skin is warm.      Capillary Refill: Capillary refill takes less than 2 seconds.   Neurological:      Mental Status: She is alert and oriented to person, place, and time.   Psychiatric:         Behavior: Behavior normal.        Result Review :  The following data was reviewed by: ROXY Gomes on 06/27/2022:  Common labs    Common Labsle 5/27/22 5/27/22 5/27/22 5/27/22    0817 0817 0817 0817   Glucose   137 (A)    BUN   15    Creatinine   0.77    Sodium   138    Potassium   3.8    Chloride   100    Calcium   9.3    Albumin   4.40    Total Bilirubin   0.4    Alkaline Phosphatase   98    AST (SGOT)   18    ALT (SGPT)   24    WBC 14.55 (A)      Hemoglobin 14.0      Hematocrit 42.8      Platelets 283      Total Cholesterol    126   Triglycerides    127   HDL Cholesterol    50   LDL Cholesterol     54   Hemoglobin A1C  6.30 (A)     (A) Abnormal value                      Assessment and Plan   Diagnoses and all orders for this visit:    1. Annual physical exam (Primary)    2. Morbid (severe) obesity due to excess calories (HCC)  -     phentermine (Adipex-P) 37.5 MG tablet; Take 1 tablet by mouth Every Morning Before Breakfast.  Dispense: 30 tablet; Refill: 2      1.  Annual physical exam:  Continue on current medications as previously prescribed   Counseling on importance of heathy eating habits and regular physical activity regimen on improving overall physical and mental health.     2.  Morbid obesity due to excess calories:  Class 3 Severe Obesity (BMI >=40). Obesity-related health conditions include the following: hypertension, dyslipidemias and GERD. Obesity is unchanged. BMI is is above average; BMI management plan is completed. We discussed portion control, increasing exercise and pharmacologic  options including Phentermine .  Body mass index is 41.89 kg/m².  Begin phentermine as prescribed  Educated on possible side effects of this medication including not limited to increased risk for tachycardia, elevated blood pressure readings  Discussed possibility of changing medication to Wegovy once it has been removed off of backorder         Follow Up   Return in about 3 months (around 9/27/2022) for Recheck for obesity .  Patient was given instructions and counseling regarding her condition or for health maintenance advice. Please see specific information pulled into the AVS if appropriate.         This document has been electronically signed by ROXY Gomes on June 27, 2022 15:01 CDT

## 2022-07-25 DIAGNOSIS — E78.00 PURE HYPERCHOLESTEROLEMIA: ICD-10-CM

## 2022-07-25 RX ORDER — EZETIMIBE 10 MG/1
10 TABLET ORAL DAILY
Qty: 30 TABLET | Refills: 1 | Status: SHIPPED | OUTPATIENT
Start: 2022-07-25 | End: 2022-09-20 | Stop reason: SDUPTHER

## 2022-07-26 DIAGNOSIS — F32.A ANXIETY AND DEPRESSION: ICD-10-CM

## 2022-07-26 DIAGNOSIS — F41.9 ANXIETY AND DEPRESSION: ICD-10-CM

## 2022-07-26 RX ORDER — VENLAFAXINE HYDROCHLORIDE 75 MG/1
75 CAPSULE, EXTENDED RELEASE ORAL DAILY
Qty: 30 CAPSULE | Refills: 1 | Status: SHIPPED | OUTPATIENT
Start: 2022-07-26 | End: 2022-09-20 | Stop reason: SDUPTHER

## 2022-09-20 DIAGNOSIS — F32.A ANXIETY AND DEPRESSION: ICD-10-CM

## 2022-09-20 DIAGNOSIS — E78.00 PURE HYPERCHOLESTEROLEMIA: ICD-10-CM

## 2022-09-20 DIAGNOSIS — F41.9 ANXIETY AND DEPRESSION: ICD-10-CM

## 2022-09-20 RX ORDER — VENLAFAXINE HYDROCHLORIDE 75 MG/1
75 CAPSULE, EXTENDED RELEASE ORAL DAILY
Qty: 30 CAPSULE | Refills: 1 | Status: SHIPPED | OUTPATIENT
Start: 2022-09-20 | End: 2022-11-18 | Stop reason: SDUPTHER

## 2022-09-20 RX ORDER — EZETIMIBE 10 MG/1
10 TABLET ORAL DAILY
Qty: 30 TABLET | Refills: 1 | Status: SHIPPED | OUTPATIENT
Start: 2022-09-20 | End: 2022-11-18 | Stop reason: SDUPTHER

## 2022-09-28 ENCOUNTER — LAB (OUTPATIENT)
Dept: LAB | Facility: HOSPITAL | Age: 47
End: 2022-09-28

## 2022-09-28 ENCOUNTER — OFFICE VISIT (OUTPATIENT)
Dept: FAMILY MEDICINE CLINIC | Facility: CLINIC | Age: 47
End: 2022-09-28

## 2022-09-28 VITALS
HEART RATE: 115 BPM | DIASTOLIC BLOOD PRESSURE: 74 MMHG | BODY MASS INDEX: 41.92 KG/M2 | OXYGEN SATURATION: 96 % | SYSTOLIC BLOOD PRESSURE: 132 MMHG | WEIGHT: 227.8 LBS | HEIGHT: 62 IN

## 2022-09-28 DIAGNOSIS — Z12.11 SCREEN FOR COLON CANCER: ICD-10-CM

## 2022-09-28 DIAGNOSIS — E66.01 MORBID (SEVERE) OBESITY DUE TO EXCESS CALORIES: Primary | ICD-10-CM

## 2022-09-28 DIAGNOSIS — Z79.899 HIGH RISK MEDICATION USE: ICD-10-CM

## 2022-09-28 DIAGNOSIS — F41.9 ANXIETY AND DEPRESSION: ICD-10-CM

## 2022-09-28 DIAGNOSIS — F32.A ANXIETY AND DEPRESSION: ICD-10-CM

## 2022-09-28 LAB
AMPHET+METHAMPHET UR QL: POSITIVE
AMPHETAMINES UR QL: NEGATIVE
BARBITURATES UR QL SCN: NEGATIVE
BENZODIAZ UR QL SCN: NEGATIVE
BUPRENORPHINE SERPL-MCNC: NEGATIVE NG/ML
CANNABINOIDS SERPL QL: NEGATIVE
COCAINE UR QL: NEGATIVE
METHADONE UR QL SCN: NEGATIVE
OPIATES UR QL: NEGATIVE
OXYCODONE UR QL SCN: NEGATIVE
PCP UR QL SCN: NEGATIVE
PROPOXYPH UR QL: NEGATIVE
TRICYCLICS UR QL SCN: NEGATIVE

## 2022-09-28 PROCEDURE — 99214 OFFICE O/P EST MOD 30 MIN: CPT | Performed by: NURSE PRACTITIONER

## 2022-09-28 PROCEDURE — 80306 DRUG TEST PRSMV INSTRMNT: CPT

## 2022-09-28 RX ORDER — LORAZEPAM 0.5 MG/1
0.5 TABLET ORAL DAILY PRN
Qty: 30 TABLET | Refills: 2 | Status: SHIPPED | OUTPATIENT
Start: 2022-09-28 | End: 2023-01-04 | Stop reason: SDUPTHER

## 2022-11-08 DIAGNOSIS — K21.9 GASTROESOPHAGEAL REFLUX DISEASE WITHOUT ESOPHAGITIS: Primary | ICD-10-CM

## 2022-11-18 DIAGNOSIS — E78.00 PURE HYPERCHOLESTEROLEMIA: ICD-10-CM

## 2022-11-18 DIAGNOSIS — F32.A ANXIETY AND DEPRESSION: ICD-10-CM

## 2022-11-18 DIAGNOSIS — I10 PRIMARY HYPERTENSION: ICD-10-CM

## 2022-11-18 DIAGNOSIS — F41.9 ANXIETY AND DEPRESSION: ICD-10-CM

## 2022-11-18 RX ORDER — VENLAFAXINE HYDROCHLORIDE 75 MG/1
75 CAPSULE, EXTENDED RELEASE ORAL DAILY
Qty: 30 CAPSULE | Refills: 1 | Status: SHIPPED | OUTPATIENT
Start: 2022-11-18 | End: 2023-01-26 | Stop reason: SDUPTHER

## 2022-11-18 RX ORDER — HYDROCHLOROTHIAZIDE 12.5 MG/1
12.5 TABLET ORAL EVERY MORNING
Qty: 90 TABLET | Refills: 1 | Status: SHIPPED | OUTPATIENT
Start: 2022-11-18

## 2022-11-18 RX ORDER — EZETIMIBE 10 MG/1
10 TABLET ORAL DAILY
Qty: 30 TABLET | Refills: 1 | Status: SHIPPED | OUTPATIENT
Start: 2022-11-18 | End: 2023-01-26 | Stop reason: SDUPTHER

## 2023-01-04 ENCOUNTER — LAB (OUTPATIENT)
Dept: LAB | Facility: HOSPITAL | Age: 48
End: 2023-01-04
Payer: COMMERCIAL

## 2023-01-04 ENCOUNTER — OFFICE VISIT (OUTPATIENT)
Dept: FAMILY MEDICINE CLINIC | Facility: CLINIC | Age: 48
End: 2023-01-04
Payer: COMMERCIAL

## 2023-01-04 VITALS
SYSTOLIC BLOOD PRESSURE: 114 MMHG | HEART RATE: 83 BPM | WEIGHT: 220.6 LBS | OXYGEN SATURATION: 99 % | DIASTOLIC BLOOD PRESSURE: 72 MMHG | HEIGHT: 62 IN | BODY MASS INDEX: 40.59 KG/M2

## 2023-01-04 DIAGNOSIS — F32.A ANXIETY AND DEPRESSION: ICD-10-CM

## 2023-01-04 DIAGNOSIS — Z79.899 HIGH RISK MEDICATION USE: ICD-10-CM

## 2023-01-04 DIAGNOSIS — F41.9 ANXIETY AND DEPRESSION: ICD-10-CM

## 2023-01-04 DIAGNOSIS — E66.01 MORBID (SEVERE) OBESITY DUE TO EXCESS CALORIES: Primary | ICD-10-CM

## 2023-01-04 LAB
AMPHET+METHAMPHET UR QL: NEGATIVE
AMPHETAMINES UR QL: NEGATIVE
BARBITURATES UR QL SCN: NEGATIVE
BENZODIAZ UR QL SCN: POSITIVE
BUPRENORPHINE SERPL-MCNC: NEGATIVE NG/ML
CANNABINOIDS SERPL QL: NEGATIVE
COCAINE UR QL: NEGATIVE
METHADONE UR QL SCN: NEGATIVE
OPIATES UR QL: NEGATIVE
OXYCODONE UR QL SCN: NEGATIVE
PCP UR QL SCN: NEGATIVE
PROPOXYPH UR QL: NEGATIVE
TRICYCLICS UR QL SCN: NEGATIVE

## 2023-01-04 PROCEDURE — 80306 DRUG TEST PRSMV INSTRMNT: CPT | Performed by: NURSE PRACTITIONER

## 2023-01-04 PROCEDURE — 99213 OFFICE O/P EST LOW 20 MIN: CPT | Performed by: NURSE PRACTITIONER

## 2023-01-04 RX ORDER — LORAZEPAM 0.5 MG/1
0.5 TABLET ORAL DAILY PRN
Qty: 30 TABLET | Refills: 2 | Status: SHIPPED | OUTPATIENT
Start: 2023-01-04

## 2023-01-04 NOTE — PROGRESS NOTES
Chief Complaint  Obesity    Subjective  3-month follow-up for obesity.  Currently on Saxenda.  \"I am just now getting to where I am tolerating the side effects.\"  Has had 12 pound weight loss since June of last year.  Needs refill on her Ativan.       Asha Alvarez presents to [facility] - [place]  Obesity  This is a chronic problem. The problem occurs constantly. The problem has been gradually improving. The symptoms are aggravated by drinking and eating. Treatments tried: Saxenda. The treatment provided mild relief.     Current Outpatient Medications on File Prior to Visit   Medication Sig Dispense Refill   • albuterol sulfate  (90 Base) MCG/ACT inhaler Inhale 1 to 2 puffs Every 4 (Four) to 6 (Six) Hours As Needed. 8.5 g 5   • Blood Glucose Monitoring Suppl (FreeStyle Freedom Lite) w/Device kit Use to check blood sugar once daily. 1 each 0   • cetirizine (zyrTEC) 10 MG tablet Take 1 tablet by mouth Daily. 30 tablet 2   • clotrimazole-betamethasone (LOTRISONE) 1-0.05 % cream Apply topically to the appropriate area as directed 2 (two) times daily. Rub in gently and completely. 45 g 1   • esomeprazole (nexIUM) 40 MG capsule Take 1 capsule by mouth Daily. 90 capsule 3   • ezetimibe (ZETIA) 10 MG tablet Take 1 tablet by mouth daily 30 tablet 1   • fluticasone (FLONASE) 50 MCG/ACT nasal spray Administer 1 to 2 puffs in the nostrils as directed once daily. 16 g 5   • hydroCHLOROthiazide (HYDRODIURIL) 12.5 MG tablet Take 1 tablet by mouth Every Morning. 90 tablet 1   • hydrOXYzine pamoate (VISTARIL) 25 MG capsule Take 1 capsule by mouth Every 8 (Eight) Hours As Needed for Anxiety. 270 capsule 2   • Insulin Pen Needle 32G X 6 MM misc Use with Saxenda once daily. 30 each 2   • Liraglutide (SAXENDA) 18 MG/3ML injection pen Inject 0.6 mg under the skin daily for week 1, THEN 1.2 mg daily for week 2, THEN 1.8 mg daily for week 3, THEN 2.4 mg daily for week 4, THEN 3 mg  daily thereafter. 15 mL 2   • losartan (Cozaar) 25 MG tablet Take 1 tablet by mouth Daily. 90 tablet 3   • montelukast (SINGULAIR) 10 MG tablet Take 1 tablet by mouth daily 90 tablet 3   • polyethylene glycol (GaviLyte-G) 236 g solution Dilute and take as directed for BOWEL PREP. 4000 mL 0   • venlafaxine XR (Effexor XR) 75 MG 24 hr capsule Take 1 capsule by mouth Daily. 30 capsule 1   • Vitamin D, Cholecalciferol, (CHOLECALCIFEROL) 10 MCG (400 UNIT) tablet Take 1 tablet by mouth Daily. 30 tablet 3   • [DISCONTINUED] LORazepam (ATIVAN) 0.5 MG tablet Take 1 tablet by mouth Daily As Needed for anxiety. 30 tablet 2   • [DISCONTINUED] methylPREDNISolone (MEDROL) 4 MG dose pack Take as directed on package with food. 21 tablet 0   • [DISCONTINUED] phentermine (Adipex-P) 37.5 MG tablet Take 1 tablet by mouth Every Morning Before Breakfast. 30 tablet 2     No current facility-administered medications on file prior to visit.     Allergies   Allergen Reactions   • Codeine Hallucinations   • Lisinopril Cough       Objective   Vital Signs:  /72   Pulse 83   Ht 157.5 cm (62\")   Wt 100 kg (220 lb 9.6 oz)   SpO2 99%   BMI 40.35 kg/m²   Estimated body mass index is 40.35 kg/m² as calculated from the following:    Height as of this encounter: 157.5 cm (62\").    Weight as of this encounter: 100 kg (220 lb 9.6 oz).      Physical Exam  Vitals and nursing note reviewed.   Constitutional:       Appearance: Normal appearance. She is well-developed. She is morbidly obese.   Cardiovascular:      Rate and Rhythm: Normal rate and regular rhythm.      Heart sounds: Normal heart sounds.   Pulmonary:      Effort: Pulmonary effort is normal.      Breath sounds: Normal breath sounds.   Abdominal:      General: Bowel sounds are normal.      Palpations: Abdomen is soft.   Musculoskeletal:         General: Normal range of motion.      Cervical back: Normal range of motion and neck supple.   Skin:     General: Skin is warm.      Capillary  Refill: Capillary refill takes less than 2 seconds.   Neurological:      Mental Status: She is alert and oriented to person, place, and time.   Psychiatric:         Behavior: Behavior normal.        Result Review :                Assessment and Plan   Diagnoses and all orders for this visit:    1. Morbid (severe) obesity due to excess calories (HCC) (Primary)    2. Anxiety and depression  -     LORazepam (ATIVAN) 0.5 MG tablet; Take 1 tablet by mouth Daily As Needed for anxiety.  Dispense: 30 tablet; Refill: 2    3. High risk medication use  -     Urine Drug Screen - Urine, Clean Catch    1.  Morbid obesity due to excess calories:  Class 3 Severe Obesity (BMI >=40). Obesity-related health conditions include the following: GERD. Obesity is improving with treatment. BMI is is above average; BMI management plan is completed. We discussed portion control, increasing exercise and pharmacologic options including Saxenda.  Body mass index is 40.35 kg/m².  Positive encouragement provided for 12 pound weight loss since Yoselin 3, 2022  Discussed changing medication from daily Saxenda to once weekly Wegovy at next appointment    2.  Anxiety and depression:  Continue Ativan and refill Rx sent to pharmacy    3.  High-risk medication use:  PDMP review  Complete UDS as ordered       Follow Up   Return in about 3 months (around 4/4/2023) for Recheck.  Patient was given instructions and counseling regarding her condition or for health maintenance advice. Please see specific information pulled into the AVS if appropriate.         This document has been electronically signed by ROXY Gomes on January 4, 2023 08:58 CST

## 2023-01-25 ENCOUNTER — PREP FOR SURGERY (OUTPATIENT)
Dept: OTHER | Facility: HOSPITAL | Age: 48
End: 2023-01-25
Payer: COMMERCIAL

## 2023-01-25 DIAGNOSIS — Z12.11 ENCOUNTER FOR SCREENING FOR MALIGNANT NEOPLASM OF COLON: Primary | ICD-10-CM

## 2023-01-25 DIAGNOSIS — K21.9 GERD (GASTROESOPHAGEAL REFLUX DISEASE): ICD-10-CM

## 2023-01-25 RX ORDER — DEXTROSE AND SODIUM CHLORIDE 5; .45 G/100ML; G/100ML
30 INJECTION, SOLUTION INTRAVENOUS CONTINUOUS PRN
Status: CANCELLED | OUTPATIENT
Start: 2023-03-16

## 2023-01-25 RX ORDER — SODIUM CHLORIDE 9 MG/ML
40 INJECTION, SOLUTION INTRAVENOUS AS NEEDED
Status: CANCELLED | OUTPATIENT
Start: 2023-03-16

## 2023-01-26 DIAGNOSIS — E78.00 PURE HYPERCHOLESTEROLEMIA: ICD-10-CM

## 2023-01-26 DIAGNOSIS — F32.A ANXIETY AND DEPRESSION: ICD-10-CM

## 2023-01-26 DIAGNOSIS — F41.9 ANXIETY AND DEPRESSION: ICD-10-CM

## 2023-01-26 RX ORDER — VENLAFAXINE HYDROCHLORIDE 75 MG/1
75 CAPSULE, EXTENDED RELEASE ORAL DAILY
Qty: 30 CAPSULE | Refills: 1 | Status: SHIPPED | OUTPATIENT
Start: 2023-01-26 | End: 2023-03-29 | Stop reason: SDUPTHER

## 2023-01-26 RX ORDER — EZETIMIBE 10 MG/1
10 TABLET ORAL DAILY
Qty: 30 TABLET | Refills: 1 | Status: SHIPPED | OUTPATIENT
Start: 2023-01-26 | End: 2023-03-29 | Stop reason: SDUPTHER

## 2023-01-31 ENCOUNTER — TELEPHONE (OUTPATIENT)
Dept: FAMILY MEDICINE CLINIC | Facility: CLINIC | Age: 48
End: 2023-01-31

## 2023-02-07 ENCOUNTER — TELEPHONE (OUTPATIENT)
Dept: FAMILY MEDICINE CLINIC | Facility: CLINIC | Age: 48
End: 2023-02-07
Payer: COMMERCIAL

## 2023-02-07 DIAGNOSIS — E66.01 MORBID (SEVERE) OBESITY DUE TO EXCESS CALORIES: ICD-10-CM

## 2023-02-07 NOTE — TELEPHONE ENCOUNTER
-Last OV 01/2023    ---- Message from Asha Alvarez sent at 2/7/2023  2:18 PM CST -----  Regarding: Junaid  Contact: [phone]  3mg

## 2023-03-16 ENCOUNTER — ANESTHESIA (OUTPATIENT)
Dept: GASTROENTEROLOGY | Facility: HOSPITAL | Age: 48
End: 2023-03-16
Payer: COMMERCIAL

## 2023-03-16 ENCOUNTER — HOSPITAL ENCOUNTER (OUTPATIENT)
Facility: HOSPITAL | Age: 48
Setting detail: HOSPITAL OUTPATIENT SURGERY
Discharge: HOME | End: 2023-03-16
Attending: INTERNAL MEDICINE | Admitting: INTERNAL MEDICINE
Payer: COMMERCIAL

## 2023-03-16 ENCOUNTER — ANESTHESIA EVENT (OUTPATIENT)
Dept: GASTROENTEROLOGY | Facility: HOSPITAL | Age: 48
End: 2023-03-16
Payer: COMMERCIAL

## 2023-03-16 VITALS
HEIGHT: 62 IN | TEMPERATURE: 97.5 F | RESPIRATION RATE: 18 BRPM | DIASTOLIC BLOOD PRESSURE: 62 MMHG | HEART RATE: 77 BPM | BODY MASS INDEX: 39.01 KG/M2 | SYSTOLIC BLOOD PRESSURE: 100 MMHG | WEIGHT: 212 LBS | OXYGEN SATURATION: 96 %

## 2023-03-16 DIAGNOSIS — K21.9 GERD (GASTROESOPHAGEAL REFLUX DISEASE): ICD-10-CM

## 2023-03-16 DIAGNOSIS — Z12.11 ENCOUNTER FOR SCREENING FOR MALIGNANT NEOPLASM OF COLON: ICD-10-CM

## 2023-03-16 PROCEDURE — 25010000002 PROPOFOL 10 MG/ML EMULSION: Performed by: NURSE ANESTHETIST, CERTIFIED REGISTERED

## 2023-03-16 RX ORDER — PROPOFOL 10 MG/ML
VIAL (ML) INTRAVENOUS AS NEEDED
Status: DISCONTINUED | OUTPATIENT
Start: 2023-03-16 | End: 2023-03-16 | Stop reason: SURG

## 2023-03-16 RX ORDER — DEXTROSE AND SODIUM CHLORIDE 5; .45 G/100ML; G/100ML
30 INJECTION, SOLUTION INTRAVENOUS CONTINUOUS PRN
Status: DISCONTINUED | OUTPATIENT
Start: 2023-03-16 | End: 2023-03-16 | Stop reason: HOSPADM

## 2023-03-16 RX ORDER — SODIUM CHLORIDE 9 MG/ML
40 INJECTION, SOLUTION INTRAVENOUS AS NEEDED
Status: DISCONTINUED | OUTPATIENT
Start: 2023-03-16 | End: 2023-03-16 | Stop reason: HOSPADM

## 2023-03-16 RX ADMIN — PROPOFOL 300 MG: 10 INJECTION, EMULSION INTRAVENOUS at 09:37

## 2023-03-16 RX ADMIN — DEXTROSE AND SODIUM CHLORIDE 30 ML/HR: 5; 450 INJECTION, SOLUTION INTRAVENOUS at 08:55

## 2023-03-16 NOTE — ANESTHESIA POSTPROCEDURE EVALUATION
Patient: Asha Alvarez    Procedure Summary     Date: 03/16/23 Room / Location: [facility] ENDOSCOPY 2 / [facility] ENDOSCOPY    Anesthesia Start: 0934 Anesthesia Stop: 0955    Procedure: COLONOSCOPY 2:30 Diagnosis:       Encounter for screening for malignant neoplasm of colon      GERD (gastroesophageal reflux disease)      (Encounter for screening for malignant neoplasm of colon [Z12.11])      (GERD (gastroesophageal reflux disease) [K21.9])    Surgeons: Mohan Barnes DO Provider: Vonda Horvath CRNA    Anesthesia Type: general ASA Status: 3          Anesthesia Type: general    Vitals  No vitals data found for the desired time range.          Post Anesthesia Care and Evaluation    Patient location during evaluation: bedside  Patient participation: complete - patient participated  Level of consciousness: sleepy but conscious  Pain score: 0  Pain management: adequate    Airway patency: patent  Anesthetic complications: No anesthetic complications  PONV Status: none  Cardiovascular status: acceptable and hemodynamically stable  Respiratory status: acceptable and room air  Hydration status: acceptable    Comments: 132/78   87   95%

## 2023-03-16 NOTE — ANESTHESIA PREPROCEDURE EVALUATION
Anesthesia Evaluation     NPO Solid Status: > 8 hours  NPO Liquid Status: > 4 hours           Airway   Mallampati: III  TM distance: <3 FB  Possible difficult intubation  Dental - normal exam     Pulmonary    (+) asthma,shortness of breath, sleep apnea on CPAP, wheezes,   Cardiovascular - normal exam    (+) hypertension well controlled, hyperlipidemia,       Neuro/Psych  GI/Hepatic/Renal/Endo    (+) obesity,  GERD well controlled,      Musculoskeletal     Abdominal   (+) obese,    Substance History      OB/GYN          Other                        Anesthesia Plan    ASA 3     general     intravenous induction     Anesthetic plan, risks, benefits, and alternatives have been provided, discussed and informed consent has been obtained with: patient.        CODE STATUS:

## 2023-03-16 NOTE — ADDENDUM NOTE
Addendum  created 03/16/23 1148 by Vonda Horvath CRNA    Review and Sign - Ready for Procedure

## 2023-03-16 NOTE — H&P
Micha Martinez DO,[facility]  Gastroenterology  Hepatology  Endoscopy  Board Certified in Internal Medicine and gastroenterology  [address], [address]  [place]. [address]  - [phone]   F - [phone]     GASTROENTEROLOGY HISTORY AND PHYSICAL  NOTE   MICHA MARTINEZ DO.         SUBJECTIVE:   3/16/2023    Name: Asha Alvarez  DOD: 1975        Chief Complaint:       Subjective : Screening for colon cancer     Patient is 47 y.o. female presents for elective colonoscopy     ROS/HISTORY/ CURRENT MEDICATIONS/OBJECTIVE/VS/PE:   Review of Systems:  All systems unremarkable unless specified below.  Constitutional   HENT  Eyes   Respiratory    Cardiovascular  Gastrointestinal   Endocrine  Genitourinary    Musculoskeletal   Skin  Allergic/Immunologic    Neurological    Hematological  Psychiatric/Behavioral    History:     Past Medical History:   Diagnosis Date   • Acute pharyngitis    • Allergic rhinitis    • Asthma    • Cough    • Depression    • Dysfunction of eustachian tube    • Elevated cholesterol    • Generalized anxiety disorder    • GERD (gastroesophageal reflux disease)    • H/O screening mammography    • High blood pressure    • History of mammography, screening     MAMMOGRAM SCREENING [facility] (2016)   • Ingrown toenail    • Motion sickness    • Otalgia    • Panic disorder    • Plantar fasciitis    • Sleep apnea    • Upper abdominal pain, unspecified    • Upper respiratory infection      Past Surgical History:   Procedure Laterality Date   •  SECTION     • OOPHORECTOMY Left    • PAP SMEAR  10/28/2008    PAP SMEAR (normal)   • SALPINGECTOMY Bilateral    • TUBAL ABDOMINAL LIGATION     • WISDOM TOOTH EXTRACTION      Dental procedure (wisdom teeth extraction)     Family History   Problem Relation Age of Onset   • Osteoporosis Mother    • Diabetes Mother    • Hypertension Mother    • Diabetes Father    • Hypertension Father    • Hypertension Maternal Grandfather     • Hypertension Paternal Grandmother    • Thyroid disease Paternal Grandmother    • Heart disease Paternal Grandfather    • Hypertension Paternal Grandfather      Social History     Tobacco Use   • Smoking status: Never   • Smokeless tobacco: Never   Vaping Use   • Vaping Use: Never used   Substance Use Topics   • Alcohol use: Not Currently   • Drug use: No     Prior to Admission medications    Medication Sig Start Date End Date Taking? Authorizing Provider   albuterol sulfate  (90 Base) MCG/ACT inhaler Inhale 1 to 2 puffs Every 4 (Four) to 6 (Six) Hours As Needed. 5/26/22  Yes Tiarra Pettit APRN   cetirizine (zyrTEC) 10 MG tablet Take 1 tablet by mouth Daily. 5/26/22  Yes Tiarra Pettit APRN   clotrimazole-betamethasone (LOTRISONE) 1-0.05 % cream Apply topically to the appropriate area as directed 2 (two) times daily. Rub in gently and completely. 6/8/22  Yes    esomeprazole (nexIUM) 40 MG capsule Take 1 capsule by mouth Daily. 5/26/22  Yes Tiarra Pettit APRN   ezetimibe (ZETIA) 10 MG tablet Take 1 tablet by mouth daily 1/26/23  Yes Tiarra Pettit APRN   hydroCHLOROthiazide (HYDRODIURIL) 12.5 MG tablet Take 1 tablet by mouth Every Morning. 11/18/22  Yes Tiarra Pettit APRN   Liraglutide (SAXENDA) 18 MG/3ML injection pen Inject 3 mg under the skin daily. 2/7/23  Yes Tiarra Pettit APRN   LORazepam (ATIVAN) 0.5 MG tablet Take 1 tablet by mouth Daily As Needed for anxiety. 1/4/23  Yes Tiarra Pettit APRN   losartan (Cozaar) 25 MG tablet Take 1 tablet by mouth Daily. 6/3/22  Yes Tiarra Pettit APRN   montelukast (SINGULAIR) 10 MG tablet Take 1 tablet by mouth daily 5/26/22  Yes Tiarra Pettit APRN   venlafaxine XR (Effexor XR) 75 MG 24 hr capsule Take 1 capsule by mouth Daily. 1/26/23  Yes Tiarra Pettit APRN   Vitamin D, Cholecalciferol, (CHOLECALCIFEROL) 10 MCG (400 UNIT) tablet Take 1 tablet by mouth Daily. 5/26/22  Yes Tiarra Pettit APRN   Blood Glucose Monitoring Suppl (FreeStyle Freedom Lite) w/Device  kit Use to check blood sugar once daily. 12/7/21      fluticasone (FLONASE) 50 MCG/ACT nasal spray Administer 1 to 2 puffs in the nostrils as directed once daily. 5/26/22   Tiarra Pettit APRN   hydrOXYzine pamoate (VISTARIL) 25 MG capsule Take 1 capsule by mouth Every 8 (Eight) Hours As Needed for Anxiety. 5/26/22   Tiarra Pettit APRN   Insulin Pen Needle 32G X 6 MM misc Use with Saxenda once daily. 9/28/22   Tiarra Pettit APRN   polyethylene glycol (GaviLyte-G) 236 g solution Dilute and take as directed for BOWEL PREP. 12/6/22        Allergies:  Codeine and Lisinopril    I have reviewed the patients medical history, surgical history and family history in the available medical record system.     Current Medications:     Current Facility-Administered Medications   Medication Dose Route Frequency Provider Last Rate Last Admin   • dextrose 5 % and sodium chloride 0.45 % infusion  30 mL/hr Intravenous Continuous PRN Mohan Barnes DO 30 mL/hr at 03/16/23 0855 30 mL/hr at 03/16/23 0855   • sodium chloride 0.9 % infusion 40 mL  40 mL Intravenous PRN oMhan Barnes DO           Objective     Physical Exam:   Temp:  [98.1 °F (36.7 °C)] 98.1 °F (36.7 °C)  Heart Rate:  [86] 86  Resp:  [18] 18  BP: (135)/(79) 135/79    Physical Exam:  General Appearance:    Alert, cooperative, in no acute distress   Head:    Normocephalic, without obvious abnormality, atraumatic   Eyes:            Lids and lashes normal, conjunctivae and sclerae normal, no icterus, no pallor, corneas clear, PERRLA   Ears:    Ears appear intact with no abnormalities noted   Throat:   No oral lesions, no thrush, oral mucosa moist   Neck:   No adenopathy, supple, trachea midline, no thyromegaly, no  carotid bruit, no JVD   Back:     No kyphosis present, no scoliosis present, no skin lesions,   erythema or scars, no tenderness to percussion or                 palpation,  range of motion normal   Lungs:     Clear to auscultation,respirations regular,  even and         unlabored    Heart:    Regular rhythm and normal rate, normal S1 and S2, no  murmur, no gallop, no rub, no click   Breast Exam:    Deferred   Abdomen:     Normal bowel sounds, no masses, no organomegaly, soft  nontender, nondistended, no guarding, no rebound                 tenderness   Genitalia:    Deferred   Extremities:   Moves all extremities well, no edema, no cyanosis, no          redness   Pulses:   Pulses palpable and equal bilaterally   Skin:   No bleeding, bruising or rash   Lymph nodes:   No palpable adenopathy   Neurologic:   Cranial nerves 2 - 12 grossly intact, sensation intact, DTR     present and equal bilaterally      Results Review:     Lab Results   Component Value Date    WBC 14.55 (H) 05/27/2022    WBC 15.29 (H) 07/08/2020    WBC 15.55 (H) 05/08/2019    HGB 14.0 05/27/2022    HGB 14.6 07/08/2020    HGB 13.8 05/08/2019    HCT 42.8 05/27/2022    HCT 43.4 07/08/2020    HCT 42.2 05/08/2019     05/27/2022     07/08/2020     05/08/2019             No results found for: LIPASE  No results found for: INR  No results found for: CULTURE    Radiology Review:  Imaging Results (Last 72 Hours)     ** No results found for the last 72 hours. **           I reviewed the patient's new clinical results.  I reviewed the patient's new imaging results and agree with the interpretation.     ASSESSMENT/PLAN:   ASSESSMENT:  1.  Screening for colon cancer    PLAN:  1.  Colonoscopy    Risk and benefits associated with the procedure are reviewed with the patient.  The patient wished to proceed     Mohan Barnes DO  03/16/23  09:28 CDT

## 2023-03-29 DIAGNOSIS — E78.00 PURE HYPERCHOLESTEROLEMIA: ICD-10-CM

## 2023-03-29 DIAGNOSIS — F32.A ANXIETY AND DEPRESSION: ICD-10-CM

## 2023-03-29 DIAGNOSIS — F41.9 ANXIETY AND DEPRESSION: ICD-10-CM

## 2023-03-29 RX ORDER — EZETIMIBE 10 MG/1
10 TABLET ORAL DAILY
Qty: 30 TABLET | Refills: 1 | Status: SHIPPED | OUTPATIENT
Start: 2023-03-29

## 2023-03-29 RX ORDER — VENLAFAXINE HYDROCHLORIDE 75 MG/1
75 CAPSULE, EXTENDED RELEASE ORAL DAILY
Qty: 30 CAPSULE | Refills: 1 | Status: SHIPPED | OUTPATIENT
Start: 2023-03-29

## 2023-04-11 DIAGNOSIS — E66.01 MORBID (SEVERE) OBESITY DUE TO EXCESS CALORIES: Primary | ICD-10-CM

## 2023-04-11 RX ORDER — SEMAGLUTIDE 0.25 MG/.5ML
0.25 INJECTION, SOLUTION SUBCUTANEOUS WEEKLY
Qty: 2 ML | Refills: 1 | Status: SHIPPED | OUTPATIENT
Start: 2023-04-11

## 2023-05-01 RX ORDER — LOSARTAN POTASSIUM 25 MG/1
25 TABLET ORAL DAILY
Qty: 90 TABLET | Refills: 3 | Status: SHIPPED | OUTPATIENT
Start: 2023-05-01

## 2023-05-04 ENCOUNTER — OFFICE VISIT (OUTPATIENT)
Dept: FAMILY MEDICINE CLINIC | Facility: CLINIC | Age: 48
End: 2023-05-04
Payer: COMMERCIAL

## 2023-05-04 VITALS
HEIGHT: 62 IN | DIASTOLIC BLOOD PRESSURE: 74 MMHG | HEART RATE: 88 BPM | SYSTOLIC BLOOD PRESSURE: 118 MMHG | OXYGEN SATURATION: 98 % | BODY MASS INDEX: 40.59 KG/M2 | WEIGHT: 220.6 LBS

## 2023-05-04 DIAGNOSIS — F41.9 ANXIETY AND DEPRESSION: ICD-10-CM

## 2023-05-04 DIAGNOSIS — F32.A ANXIETY AND DEPRESSION: ICD-10-CM

## 2023-05-04 DIAGNOSIS — E66.01 MORBID (SEVERE) OBESITY DUE TO EXCESS CALORIES: Primary | ICD-10-CM

## 2023-05-04 RX ORDER — SEMAGLUTIDE 0.25 MG/.5ML
0.25 INJECTION, SOLUTION SUBCUTANEOUS WEEKLY
Qty: 2 ML | Refills: 1 | Status: SHIPPED | OUTPATIENT
Start: 2023-05-04

## 2023-05-04 RX ORDER — LORAZEPAM 0.5 MG/1
0.5 TABLET ORAL DAILY PRN
Qty: 30 TABLET | Refills: 2 | Status: SHIPPED | OUTPATIENT
Start: 2023-05-04

## 2023-05-04 NOTE — PROGRESS NOTES
"Chief Complaint  Obesity (Follow up ) and Med Refill    Subjective   Three month follow-up for obesity and anxiety.  Started on Wegovy at previous visit.  \"I have not started it yet.  I just now got the approval from the insurance company.\"  Needs refill on lorazepam      Asha Jacklyn Alvarez presents to [facility] [place]  Obesity  This is a chronic problem. The problem occurs constantly. The problem has been unchanged. The symptoms are aggravated by drinking and eating. Treatments tried: Phentermine and Saxenda. The treatment provided mild relief.   Anxiety  Presents for follow-up visit. Symptoms include decreased concentration, depressed mood, excessive worry and nervous/anxious behavior. Symptoms occur occasionally. The severity of symptoms is moderate. The quality of sleep is fair. Nighttime awakenings: occasional.     Compliance with medications is %.     Current Outpatient Medications on File Prior to Visit   Medication Sig Dispense Refill   • albuterol sulfate  (90 Base) MCG/ACT inhaler Inhale 1 to 2 puffs Every 4 (Four) to 6 (Six) Hours As Needed. 8.5 g 5   • cetirizine (zyrTEC) 10 MG tablet Take 1 tablet by mouth Daily. 30 tablet 2   • clotrimazole-betamethasone (LOTRISONE) 1-0.05 % cream Apply topically to the appropriate area as directed 2 (two) times daily. Rub in gently and completely. 45 g 1   • esomeprazole (nexIUM) 40 MG capsule Take 1 capsule by mouth Daily. 90 capsule 3   • ezetimibe (ZETIA) 10 MG tablet Take 1 tablet by mouth daily. 30 tablet 1   • fluticasone (FLONASE) 50 MCG/ACT nasal spray Administer 1 to 2 puffs in the nostrils as directed once daily. 16 g 5   • hydroCHLOROthiazide (HYDRODIURIL) 12.5 MG tablet Take 1 tablet by mouth Every Morning. 90 tablet 1   • hydrOXYzine pamoate (VISTARIL) 25 MG capsule Take 1 capsule by mouth Every 8 (Eight) Hours As Needed for Anxiety. 270 capsule 2   • losartan (Cozaar) 25 MG tablet Take 1 " "tablet by mouth Daily. 90 tablet 3   • montelukast (SINGULAIR) 10 MG tablet Take 1 tablet by mouth daily 90 tablet 3   • venlafaxine XR (Effexor XR) 75 MG 24 hr capsule Take 1 capsule by mouth Daily. 30 capsule 1   • Vitamin D, Cholecalciferol, (CHOLECALCIFEROL) 10 MCG (400 UNIT) tablet Take 1 tablet by mouth Daily. 30 tablet 3   • [DISCONTINUED] LORazepam (ATIVAN) 0.5 MG tablet Take 1 tablet by mouth Daily As Needed for anxiety. 30 tablet 2   • [DISCONTINUED] Semaglutide-Weight Management (Wegovy) 0.25 MG/0.5ML solution auto-injector Inject 0.25 mg (1 pen) under the skin into the appropriate area as directed 1 (One) Time Per Week. 2 mL 1   • [DISCONTINUED] Blood Glucose Monitoring Suppl (FreeStyle Freedom Lite) w/Device kit Use to check blood sugar once daily. 1 each 0   • [DISCONTINUED] Insulin Pen Needle 32G X 6 MM misc Use with Saxenda once daily. 30 each 2   • [DISCONTINUED] polyethylene glycol (GaviLyte-G) 236 g solution Dilute and take as directed for BOWEL PREP. 4000 mL 0     No current facility-administered medications on file prior to visit.     Allergies   Allergen Reactions   • Codeine Hallucinations   • Lisinopril Cough       Objective   Vital Signs:  /74   Pulse 88   Ht 157.5 cm (62\")   Wt 100 kg (220 lb 9.6 oz)   SpO2 98%   BMI 40.35 kg/m²   Estimated body mass index is 40.35 kg/m² as calculated from the following:    Height as of this encounter: 157.5 cm (62\").    Weight as of this encounter: 100 kg (220 lb 9.6 oz).         Physical Exam  Vitals and nursing note reviewed.   Constitutional:       Appearance: Normal appearance. She is well-developed. She is morbidly obese.   HENT:      Head: Normocephalic.   Cardiovascular:      Rate and Rhythm: Normal rate and regular rhythm.      Heart sounds: Normal heart sounds.   Pulmonary:      Effort: Pulmonary effort is normal.      Breath sounds: Normal breath sounds.   Musculoskeletal:         General: Normal range of motion.   Skin:     General: " Skin is warm.   Neurological:      Mental Status: She is alert and oriented to person, place, and time.   Psychiatric:         Behavior: Behavior normal.        Result Review :                   Assessment and Plan   Diagnoses and all orders for this visit:    1. Morbid (severe) obesity due to excess calories (Primary)  -     Semaglutide-Weight Management (Wegovy) 0.25 MG/0.5ML solution auto-injector; Inject 0.25 mg (1 pen) under the skin into the appropriate area as directed 1 (One) Time Per Week.  Dispense: 2 mL; Refill: 1    2. Anxiety and depression  -     LORazepam (ATIVAN) 0.5 MG tablet; Take 1 tablet by mouth Daily As Needed for anxiety.  Dispense: 30 tablet; Refill: 2      1.  Morbid obesity due to excess calories:  Body mass index is 40.35 kg/m².  Class 3 Severe Obesity (BMI >=40). Obesity-related health conditions include the following: hypertension, dyslipidemias and GERD. Obesity is unchanged. BMI is is above average; BMI management plan is completed. We discussed portion control, increasing exercise and pharmacologic options including Wegovy .  Begin Wegovy as prescribed and updated prescription sent to pharmacy  Educated on possible adverse reactions of this medication including not limited to medullary thyroid cancer, endocrine neoplasia type II, pancreatitis and cholecystitis  Educated on common side effects of this medication including not limited to increased risk for gastrointestinal discomfort, nausea, indigestion, belching, diarrhea and/or constipation    2.  Anxiety and depression:  Continue Ambien refill prescription sent to pharmacy  PDMP reviewed       Follow Up   Return in about 3 months (around 8/4/2023).  Patient was given instructions and counseling regarding her condition or for health maintenance advice. Please see specific information pulled into the AVS if appropriate.         This document has been electronically signed by ROXY Gomes on May 4, 2023 08:34 CDT

## 2023-05-08 DIAGNOSIS — B97.89 VIRAL RESPIRATORY ILLNESS: ICD-10-CM

## 2023-05-08 DIAGNOSIS — J98.8 VIRAL RESPIRATORY ILLNESS: ICD-10-CM

## 2023-05-08 RX ORDER — FLUTICASONE PROPIONATE 50 MCG
SPRAY, SUSPENSION (ML) NASAL
Qty: 16 G | Refills: 5 | Status: SHIPPED | OUTPATIENT
Start: 2023-05-08

## 2023-05-22 DIAGNOSIS — I10 PRIMARY HYPERTENSION: ICD-10-CM

## 2023-05-22 DIAGNOSIS — K21.9 GASTROESOPHAGEAL REFLUX DISEASE WITHOUT ESOPHAGITIS: ICD-10-CM

## 2023-05-22 DIAGNOSIS — B97.89 VIRAL RESPIRATORY ILLNESS: ICD-10-CM

## 2023-05-22 DIAGNOSIS — J98.8 VIRAL RESPIRATORY ILLNESS: ICD-10-CM

## 2023-05-22 RX ORDER — HYDROCHLOROTHIAZIDE 12.5 MG/1
12.5 TABLET ORAL EVERY MORNING
Qty: 90 TABLET | Refills: 1 | Status: SHIPPED | OUTPATIENT
Start: 2023-05-22

## 2023-05-22 RX ORDER — MONTELUKAST SODIUM 10 MG/1
10 TABLET ORAL DAILY
Qty: 90 TABLET | Refills: 3 | Status: SHIPPED | OUTPATIENT
Start: 2023-05-22

## 2023-05-22 RX ORDER — ESOMEPRAZOLE MAGNESIUM 40 MG/1
40 CAPSULE, DELAYED RELEASE ORAL DAILY
Qty: 90 CAPSULE | Refills: 3 | Status: SHIPPED | OUTPATIENT
Start: 2023-05-22

## 2023-06-01 DIAGNOSIS — E66.01 MORBID (SEVERE) OBESITY DUE TO EXCESS CALORIES: Primary | ICD-10-CM

## 2023-06-01 RX ORDER — SEMAGLUTIDE 0.5 MG/.5ML
0.5 INJECTION, SOLUTION SUBCUTANEOUS
Qty: 2 ML | Refills: 1 | Status: SHIPPED | OUTPATIENT
Start: 2023-06-01

## 2023-06-08 DIAGNOSIS — E78.00 PURE HYPERCHOLESTEROLEMIA: ICD-10-CM

## 2023-06-08 DIAGNOSIS — F32.A ANXIETY AND DEPRESSION: ICD-10-CM

## 2023-06-08 DIAGNOSIS — F41.9 ANXIETY AND DEPRESSION: ICD-10-CM

## 2023-06-08 RX ORDER — VENLAFAXINE HYDROCHLORIDE 75 MG/1
75 CAPSULE, EXTENDED RELEASE ORAL DAILY
Qty: 30 CAPSULE | Refills: 1 | Status: SHIPPED | OUTPATIENT
Start: 2023-06-08

## 2023-06-08 RX ORDER — EZETIMIBE 10 MG/1
10 TABLET ORAL DAILY
Qty: 30 TABLET | Refills: 1 | Status: SHIPPED | OUTPATIENT
Start: 2023-06-08

## 2023-07-26 DIAGNOSIS — E66.01 MORBID (SEVERE) OBESITY DUE TO EXCESS CALORIES: Primary | ICD-10-CM

## 2023-08-11 DIAGNOSIS — F32.A ANXIETY AND DEPRESSION: ICD-10-CM

## 2023-08-11 DIAGNOSIS — E78.00 PURE HYPERCHOLESTEROLEMIA: ICD-10-CM

## 2023-08-11 DIAGNOSIS — F41.9 ANXIETY AND DEPRESSION: ICD-10-CM

## 2023-08-11 RX ORDER — EZETIMIBE 10 MG/1
10 TABLET ORAL DAILY
Qty: 30 TABLET | Refills: 1 | Status: SHIPPED | OUTPATIENT
Start: 2023-08-11

## 2023-08-11 RX ORDER — VENLAFAXINE HYDROCHLORIDE 75 MG/1
75 CAPSULE, EXTENDED RELEASE ORAL DAILY
Qty: 30 CAPSULE | Refills: 1 | Status: SHIPPED | OUTPATIENT
Start: 2023-08-11

## (undated) DEVICE — CANN SMPL SOFTECH BIFLO ETCO2 A/M 7FT